# Patient Record
Sex: FEMALE | Race: WHITE | HISPANIC OR LATINO | Employment: OTHER | URBAN - METROPOLITAN AREA
[De-identification: names, ages, dates, MRNs, and addresses within clinical notes are randomized per-mention and may not be internally consistent; named-entity substitution may affect disease eponyms.]

---

## 2023-06-05 LAB
ANION GAP IN SER/PLAS: 11 MMOL/L (ref 10–20)
CALCIUM (MG/DL) IN SER/PLAS: 10.6 MG/DL (ref 8.6–10.6)
CARBON DIOXIDE, TOTAL (MMOL/L) IN SER/PLAS: 32 MMOL/L (ref 21–32)
CHLORIDE (MMOL/L) IN SER/PLAS: 102 MMOL/L (ref 98–107)
CREATININE (MG/DL) IN SER/PLAS: 1.08 MG/DL (ref 0.5–1.05)
ERYTHROCYTE DISTRIBUTION WIDTH (RATIO) BY AUTOMATED COUNT: 14.4 % (ref 11.5–14.5)
ERYTHROCYTE MEAN CORPUSCULAR HEMOGLOBIN CONCENTRATION (G/DL) BY AUTOMATED: 31.7 G/DL (ref 32–36)
ERYTHROCYTE MEAN CORPUSCULAR VOLUME (FL) BY AUTOMATED COUNT: 90 FL (ref 80–100)
ERYTHROCYTES (10*6/UL) IN BLOOD BY AUTOMATED COUNT: 4.29 X10E12/L (ref 4–5.2)
GFR FEMALE: 49 ML/MIN/1.73M2
GLUCOSE (MG/DL) IN SER/PLAS: 87 MG/DL (ref 74–99)
HEMATOCRIT (%) IN BLOOD BY AUTOMATED COUNT: 38.5 % (ref 36–46)
HEMOGLOBIN (G/DL) IN BLOOD: 12.2 G/DL (ref 12–16)
INR IN PPP BY COAGULATION ASSAY: 1 (ref 0.9–1.1)
LEUKOCYTES (10*3/UL) IN BLOOD BY AUTOMATED COUNT: 6.7 X10E9/L (ref 4.4–11.3)
NRBC (PER 100 WBCS) BY AUTOMATED COUNT: 0 /100 WBC (ref 0–0)
PLATELETS (10*3/UL) IN BLOOD AUTOMATED COUNT: 173 X10E9/L (ref 150–450)
POTASSIUM (MMOL/L) IN SER/PLAS: 4.4 MMOL/L (ref 3.5–5.3)
PROTHROMBIN TIME (PT) IN PPP BY COAGULATION ASSAY: 11.1 SEC (ref 9.8–13.4)
SODIUM (MMOL/L) IN SER/PLAS: 141 MMOL/L (ref 136–145)
UREA NITROGEN (MG/DL) IN SER/PLAS: 44 MG/DL (ref 6–23)

## 2023-06-21 ENCOUNTER — HOSPITAL ENCOUNTER (OUTPATIENT)
Dept: DATA CONVERSION | Facility: HOSPITAL | Age: 88
End: 2023-06-21
Attending: INTERNAL MEDICINE | Admitting: INTERNAL MEDICINE
Payer: MEDICARE

## 2023-06-21 DIAGNOSIS — N18.9 CHRONIC KIDNEY DISEASE, UNSPECIFIED: ICD-10-CM

## 2023-06-21 DIAGNOSIS — I35.0 NONRHEUMATIC AORTIC (VALVE) STENOSIS: ICD-10-CM

## 2023-06-21 DIAGNOSIS — E66.9 OBESITY, UNSPECIFIED: ICD-10-CM

## 2023-06-21 DIAGNOSIS — E78.5 HYPERLIPIDEMIA, UNSPECIFIED: ICD-10-CM

## 2023-06-21 DIAGNOSIS — I73.9 PERIPHERAL VASCULAR DISEASE, UNSPECIFIED (CMS-HCC): ICD-10-CM

## 2023-06-21 DIAGNOSIS — Z87.891 PERSONAL HISTORY OF NICOTINE DEPENDENCE: ICD-10-CM

## 2023-06-21 DIAGNOSIS — J44.9 CHRONIC OBSTRUCTIVE PULMONARY DISEASE, UNSPECIFIED (MULTI): ICD-10-CM

## 2023-06-21 DIAGNOSIS — I12.9 HYPERTENSIVE CHRONIC KIDNEY DISEASE WITH STAGE 1 THROUGH STAGE 4 CHRONIC KIDNEY DISEASE, OR UNSPECIFIED CHRONIC KIDNEY DISEASE: ICD-10-CM

## 2023-06-21 LAB
APPARATUS: ABNORMAL
FIO2: 28 %
FIO2: 28 % (ref 21–100)
PATIENT TEMPERATURE: 37 DEGREES
PATIENT TEMPERATURE: 37 DEGREES C
POCT BASE EXCESS, ARTERIAL: 2.9 MMOL/L (ref -2–3)
POCT BASE EXCESS, MIXED: 5.8 MMOL/L
POCT HCO3, ARTERIAL: 27.9 MMOL/L (ref 22–26)
POCT HCO3, MIXED: 31.7 MMOL/L
POCT OXY HEMOGLOBIN, ARTERIAL: 95.3 % (ref 94–98)
POCT OXY HEMOGLOBIN, MIXED: 64.7 % (ref 45–75)
POCT PCO2, ARTERIAL: 43 MMHG (ref 38–42)
POCT PCO2, MIXED: 50 MMHG
POCT PH, ARTERIAL: 7.42 (ref 7.38–7.42)
POCT PH, MIXED: 7.41
POCT PO2, ARTERIAL: 88 MMHG (ref 85–95)
POCT PO2, MIXED: 42 MMHG
POCT SO2, ARTERIAL: 96 % (ref 94–100)
POCT SO2, MIXED: 66 %
SITE OF ARTERIAL PUNCTURE: ABNORMAL

## 2023-06-25 LAB
ATRIAL RATE: 70 BPM
P AXIS: 59 DEGREES
P OFFSET: 204 MS
P ONSET: 140 MS
PR INTERVAL: 154 MS
Q ONSET: 217 MS
QRS COUNT: 12 BEATS
QRS DURATION: 90 MS
QT INTERVAL: 424 MS
QTC CALCULATION(BAZETT): 457 MS
QTC FREDERICIA: 446 MS
R AXIS: 70 DEGREES
T AXIS: 73 DEGREES
T OFFSET: 429 MS
VENTRICULAR RATE: 70 BPM

## 2023-07-24 LAB
ANION GAP IN SER/PLAS: 15 MMOL/L (ref 10–20)
CALCIUM (MG/DL) IN SER/PLAS: 9.5 MG/DL (ref 8.6–10.6)
CARBON DIOXIDE, TOTAL (MMOL/L) IN SER/PLAS: 30 MMOL/L (ref 21–32)
CHLORIDE (MMOL/L) IN SER/PLAS: 100 MMOL/L (ref 98–107)
CREATININE (MG/DL) IN SER/PLAS: 1.2 MG/DL (ref 0.5–1.05)
GFR FEMALE: 43 ML/MIN/1.73M2
GLUCOSE (MG/DL) IN SER/PLAS: 107 MG/DL (ref 74–99)
POTASSIUM (MMOL/L) IN SER/PLAS: 4.6 MMOL/L (ref 3.5–5.3)
SODIUM (MMOL/L) IN SER/PLAS: 140 MMOL/L (ref 136–145)
UREA NITROGEN (MG/DL) IN SER/PLAS: 36 MG/DL (ref 6–23)

## 2023-08-11 ENCOUNTER — APPOINTMENT (OUTPATIENT)
Dept: PRIMARY CARE | Facility: CLINIC | Age: 88
End: 2023-08-11
Payer: MEDICARE

## 2023-08-18 ENCOUNTER — OFFICE VISIT (OUTPATIENT)
Dept: PRIMARY CARE | Facility: CLINIC | Age: 88
End: 2023-08-18
Payer: MEDICARE

## 2023-08-18 VITALS
BODY MASS INDEX: 39.27 KG/M2 | SYSTOLIC BLOOD PRESSURE: 120 MMHG | HEIGHT: 60 IN | TEMPERATURE: 97.5 F | DIASTOLIC BLOOD PRESSURE: 80 MMHG | HEART RATE: 72 BPM | WEIGHT: 200 LBS | OXYGEN SATURATION: 93 %

## 2023-08-18 DIAGNOSIS — E55.9 VITAMIN D DEFICIENCY: ICD-10-CM

## 2023-08-18 DIAGNOSIS — Z00.00 ROUTINE GENERAL MEDICAL EXAMINATION AT HEALTH CARE FACILITY: Primary | ICD-10-CM

## 2023-08-18 DIAGNOSIS — I50.9 CHRONIC HEART FAILURE, UNSPECIFIED HEART FAILURE TYPE (MULTI): ICD-10-CM

## 2023-08-18 DIAGNOSIS — I10 PRIMARY HYPERTENSION: ICD-10-CM

## 2023-08-18 DIAGNOSIS — E66.01 CLASS 2 SEVERE OBESITY DUE TO EXCESS CALORIES WITH SERIOUS COMORBIDITY AND BODY MASS INDEX (BMI) OF 39.0 TO 39.9 IN ADULT (MULTI): ICD-10-CM

## 2023-08-18 DIAGNOSIS — J44.9 MODERATE COPD (CHRONIC OBSTRUCTIVE PULMONARY DISEASE) (MULTI): ICD-10-CM

## 2023-08-18 DIAGNOSIS — S32.9XXS CLOSED NONDISPLACED FRACTURE OF PELVIS, UNSPECIFIED PART OF PELVIS, SEQUELA: ICD-10-CM

## 2023-08-18 DIAGNOSIS — J96.11 CHRONIC RESPIRATORY FAILURE WITH HYPOXIA (MULTI): ICD-10-CM

## 2023-08-18 DIAGNOSIS — E78.2 MIXED HYPERLIPIDEMIA: ICD-10-CM

## 2023-08-18 DIAGNOSIS — N18.31 CHRONIC RENAL IMPAIRMENT, STAGE 3A (MULTI): ICD-10-CM

## 2023-08-18 PROBLEM — E66.812 CLASS 2 SEVERE OBESITY DUE TO EXCESS CALORIES WITH SERIOUS COMORBIDITY AND BODY MASS INDEX (BMI) OF 39.0 TO 39.9 IN ADULT: Status: ACTIVE | Noted: 2023-08-18

## 2023-08-18 PROBLEM — S32.9XXA PELVIC FRACTURE (MULTI): Status: ACTIVE | Noted: 2023-08-18

## 2023-08-18 PROCEDURE — 1160F RVW MEDS BY RX/DR IN RCRD: CPT | Performed by: FAMILY MEDICINE

## 2023-08-18 PROCEDURE — G0442 ANNUAL ALCOHOL SCREEN 15 MIN: HCPCS | Performed by: FAMILY MEDICINE

## 2023-08-18 PROCEDURE — G0439 PPPS, SUBSEQ VISIT: HCPCS | Performed by: FAMILY MEDICINE

## 2023-08-18 PROCEDURE — 3079F DIAST BP 80-89 MM HG: CPT | Performed by: FAMILY MEDICINE

## 2023-08-18 PROCEDURE — 1170F FXNL STATUS ASSESSED: CPT | Performed by: FAMILY MEDICINE

## 2023-08-18 PROCEDURE — 1158F ADVNC CARE PLAN TLK DOCD: CPT | Performed by: FAMILY MEDICINE

## 2023-08-18 PROCEDURE — 1036F TOBACCO NON-USER: CPT | Performed by: FAMILY MEDICINE

## 2023-08-18 PROCEDURE — 99497 ADVNCD CARE PLAN 30 MIN: CPT | Performed by: FAMILY MEDICINE

## 2023-08-18 PROCEDURE — 99397 PER PM REEVAL EST PAT 65+ YR: CPT | Performed by: FAMILY MEDICINE

## 2023-08-18 PROCEDURE — 3074F SYST BP LT 130 MM HG: CPT | Performed by: FAMILY MEDICINE

## 2023-08-18 PROCEDURE — 1159F MED LIST DOCD IN RCRD: CPT | Performed by: FAMILY MEDICINE

## 2023-08-18 PROCEDURE — 1125F AMNT PAIN NOTED PAIN PRSNT: CPT | Performed by: FAMILY MEDICINE

## 2023-08-18 RX ORDER — ACETAMINOPHEN 500 MG
1 TABLET ORAL DAILY
COMMUNITY
Start: 2023-07-25

## 2023-08-18 RX ORDER — SIMVASTATIN 20 MG/1
1 TABLET, FILM COATED ORAL DAILY
COMMUNITY
Start: 2015-04-24 | End: 2023-08-18 | Stop reason: SDUPTHER

## 2023-08-18 RX ORDER — NIACIN (INOSITOL NIACINATE) 400(500MG)
1 CAPSULE ORAL DAILY
COMMUNITY

## 2023-08-18 RX ORDER — ASPIRIN 81 MG/1
1 TABLET ORAL DAILY
COMMUNITY

## 2023-08-18 RX ORDER — BUDESONIDE AND FORMOTEROL FUMARATE DIHYDRATE 80; 4.5 UG/1; UG/1
AEROSOL RESPIRATORY (INHALATION)
COMMUNITY
Start: 2019-07-19 | End: 2023-10-17 | Stop reason: WASHOUT

## 2023-08-18 RX ORDER — TIOTROPIUM BROMIDE INHALATION SPRAY 3.12 UG/1
2 SPRAY, METERED RESPIRATORY (INHALATION) DAILY
COMMUNITY
Start: 2021-04-19 | End: 2023-08-18 | Stop reason: SDUPTHER

## 2023-08-18 RX ORDER — POTASSIUM CHLORIDE 750 MG/1
1 TABLET, FILM COATED, EXTENDED RELEASE ORAL DAILY
COMMUNITY
Start: 2021-01-19

## 2023-08-18 RX ORDER — MULTIVITAMIN
1 TABLET ORAL DAILY
COMMUNITY

## 2023-08-18 RX ORDER — LOSARTAN POTASSIUM 100 MG/1
1 TABLET ORAL DAILY
COMMUNITY
Start: 2016-08-01 | End: 2023-08-18 | Stop reason: SDUPTHER

## 2023-08-18 RX ORDER — GLUCOSAMINE/CHONDR SU A SOD 750-600 MG
TABLET ORAL
COMMUNITY
Start: 2018-05-10

## 2023-08-18 RX ORDER — MIRABEGRON 25 MG/1
1 TABLET, FILM COATED, EXTENDED RELEASE ORAL DAILY
COMMUNITY
Start: 2020-09-03 | End: 2023-10-17 | Stop reason: WASHOUT

## 2023-08-18 RX ORDER — ALBUTEROL SULFATE 90 UG/1
2 AEROSOL, METERED RESPIRATORY (INHALATION) EVERY 4 HOURS PRN
Qty: 18 G | Refills: 3 | Status: SHIPPED | OUTPATIENT
Start: 2023-08-18 | End: 2024-03-27 | Stop reason: SDUPTHER

## 2023-08-18 RX ORDER — TIOTROPIUM BROMIDE INHALATION SPRAY 3.12 UG/1
2 SPRAY, METERED RESPIRATORY (INHALATION) DAILY
Qty: 3 EACH | Refills: 3 | Status: SHIPPED | OUTPATIENT
Start: 2023-08-18 | End: 2024-08-17

## 2023-08-18 RX ORDER — SIMVASTATIN 20 MG/1
20 TABLET, FILM COATED ORAL DAILY
Qty: 90 TABLET | Refills: 3 | Status: SHIPPED | OUTPATIENT
Start: 2023-08-18 | End: 2024-03-27 | Stop reason: SDUPTHER

## 2023-08-18 RX ORDER — LOSARTAN POTASSIUM 100 MG/1
100 TABLET ORAL DAILY
Qty: 90 TABLET | Refills: 3 | Status: SHIPPED | OUTPATIENT
Start: 2023-08-18 | End: 2024-03-27 | Stop reason: SDUPTHER

## 2023-08-18 RX ORDER — TORSEMIDE 20 MG/1
1 TABLET ORAL DAILY
COMMUNITY
Start: 2021-01-19 | End: 2023-08-18 | Stop reason: SDUPTHER

## 2023-08-18 RX ORDER — TORSEMIDE 20 MG/1
20 TABLET ORAL DAILY
Qty: 90 TABLET | Refills: 3 | Status: SHIPPED | OUTPATIENT
Start: 2023-08-18 | End: 2023-10-23 | Stop reason: SDUPTHER

## 2023-08-18 RX ORDER — CALCIUM CARBONATE 600 MG
1200 TABLET ORAL DAILY
COMMUNITY
Start: 2016-05-05

## 2023-08-18 RX ORDER — ALBUTEROL SULFATE 90 UG/1
AEROSOL, METERED RESPIRATORY (INHALATION)
COMMUNITY
Start: 2021-08-05 | End: 2023-08-18 | Stop reason: SDUPTHER

## 2023-08-18 ASSESSMENT — PATIENT HEALTH QUESTIONNAIRE - PHQ9
2. FEELING DOWN, DEPRESSED OR HOPELESS: NOT AT ALL
SUM OF ALL RESPONSES TO PHQ9 QUESTIONS 1 AND 2: 0
SUM OF ALL RESPONSES TO PHQ9 QUESTIONS 1 & 2: 0
2. FEELING DOWN, DEPRESSED OR HOPELESS: NOT AT ALL
1. LITTLE INTEREST OR PLEASURE IN DOING THINGS: NOT AT ALL
1. LITTLE INTEREST OR PLEASURE IN DOING THINGS: NOT AT ALL

## 2023-08-18 ASSESSMENT — ACTIVITIES OF DAILY LIVING (ADL)
MANAGING_FINANCES: INDEPENDENT
GROCERY_SHOPPING: INDEPENDENT
BATHING: INDEPENDENT
TAKING_MEDICATION: INDEPENDENT
DOING_HOUSEWORK: INDEPENDENT
DRESSING: INDEPENDENT

## 2023-08-18 ASSESSMENT — LIFESTYLE VARIABLES
HOW OFTEN DO YOU HAVE A DRINK CONTAINING ALCOHOL: NEVER
HOW OFTEN DO YOU HAVE SIX OR MORE DRINKS ON ONE OCCASION: NEVER

## 2023-08-18 ASSESSMENT — ENCOUNTER SYMPTOMS
LOSS OF SENSATION IN FEET: 0
OCCASIONAL FEELINGS OF UNSTEADINESS: 0
DEPRESSION: 0

## 2023-08-18 ASSESSMENT — PAIN SCALES - GENERAL: PAINLEVEL: 6

## 2023-08-18 NOTE — ACP (ADVANCE CARE PLANNING)
Confirming Previous Code Status:   Advance Care Planning Note     Discussion Date: 08/18/23   Discussion Participants: patient    The patient wishes to discuss Advance Care Planning today and the following is a brief summary of our discussion.     Patient has capacity to make their own medical decisions: Yes  Health Care Agent/Surrogate Decision Maker documented in chart: Yes    Documents on file and valid:  Advance Directive/Living Will: Yes   Health Care Power of : Yes  Other:     Communication of Medical Status/Prognosis:        Communication of Treatment Goals/Options:        Treatment Decisions  Goals of Care: quality of life is prioritized; willing to accept low-burden treatments to achieve meaningful goals     Follow Up Plan    Team Members    Time Statement: Total face to face time spent on advance care planning was 16 minutes with 16 minutes spent in counseling, including the explanation.    Ney Crawley DO  8/18/2023 10:53 AM

## 2023-08-18 NOTE — PROGRESS NOTES
Subjective   Reason for Visit: Chelsey Cazares is an 89 y.o. female here for a Medicare Wellness visit.     Past Medical, Surgical, and Family History reviewed and updated in chart.    Reviewed all medications by prescribing practitioner or clinical pharmacist (such as prescriptions, OTCs, herbal therapies and supplements) and documented in the medical record.    HPI  89-year-old female presents for Medicare wellness visit and complete physical exam.  Will be having aortic valve surgery upcoming.  Medication refills.  No other acute complaints.  Patient Care Team:  Ney CHAKRABORTY DO as PCP - General  Ney CHAKRABORTY DO as PCP - [employer] PCP     Review of Systems  Constitutional: no chills, no fever and no night sweats.   Eyes: no blurred vision and no eyesight problems.   ENT: no hearing loss, no nasal congestion, no nasal discharge, no hoarseness and no sore throat.   Cardiovascular: no chest pain, no intermittent leg claudication, no lower extremity edema, no palpitations and no syncope.   Respiratory: no cough, no shortness of breath during exertion, no shortness of breath at rest and no wheezing.   Gastrointestinal: no abdominal pain, no blood in stools, no constipation, no diarrhea, no melena, no nausea, no rectal pain and no vomiting.   Genitourinary: no dysuria, no change in urinary frequency, no urinary hesitancy and no feelings of urinary urgency.   Neurological: no difficulty walking, no headache, no limb weakness, no numbness and no tingling.   Psychiatric: no anxiety, no depression, no anhedonia and no substance use disorders.   Endocrine: no recent weight gain and no recent weight loss.   Hematologic/Lymphatic: no tendency for easy bruising and no swollen glands.  Objective   Vitals:  /80 (BP Location: Left arm, Patient Position: Sitting, BP Cuff Size: Large adult)   Pulse 72   Temp 36.4 °C (97.5 °F) (Temporal)   Ht 1.524 m (5')   Wt 90.7 kg (200 lb)   SpO2 93%   BMI  39.06 kg/m²       Physical Exam  Constitutional: Alert and in no acute distress. Well developed, well nourished.   Eyes: Pupils were equal in size, round, reactive to light (PERRL) with normal accommodation and extraocular movements intact (EOMI). Ophthalmoscopic examination: Normal.   Ears, Nose, Mouth, and Throat: External inspection of ears and nose: Normal. Otoscopic examination: Normal. Lips, teeth, and gums: Normal. Oropharynx: Normal.   Neck: No neck mass was observed. Supple. Thyroid not enlarged and there were no palpable thyroid nodules.   Cardiovascular: Heart rate and rhythm were normal, normal S1 and S2, no gallops, 3/6 MARCOS murmur and no pericardial rub. Carotid pulses: Normal with no bruits. Abdominal aorta: Normal. Pedal pulses: Normal. No peripheral edema.   Pulmonary: No respiratory distress. Clear bilateral breath sounds.   Abdomen: Soft nontender; no abdominal mass palpated. Normal bowel sounds. No organomegaly.   Skin: Normal skin color and pigmentation, normal skin turgor, and no rash.   Psychiatric: Judgment and insight: Intact. Mood and affect: Normal  Assessment/Plan   Problem List Items Addressed This Visit       Routine general medical examination at health care facility - Primary    Chronic heart failure, unspecified heart failure type (CMS/HCC)    Relevant Medications    torsemide (Demadex) 20 mg tablet    Chronic respiratory failure with hypoxia (CMS/[facility])    Chronic renal impairment, stage 3a (CMS/HCC)    Class 2 severe obesity due to excess calories with serious comorbidity and body mass index (BMI) of 39.0 to 39.9 in adult (CMS/[facility])    Mixed hyperlipidemia    Relevant Medications    simvastatin (Zocor) 20 mg tablet    Primary hypertension    Relevant Medications    losartan (Cozaar) 100 mg tablet    Vitamin D deficiency    Relevant Orders    Vitamin D 25-Hydroxy,Total    Moderate COPD (chronic obstructive pulmonary disease) (CMS/[facility])    Relevant Medications    Spiriva Respimat 2.5  mcg/actuation inhaler    albuterol 90 mcg/actuation inhaler    Pelvic fracture (CMS/[facility])       #1.  Stable physical exam.  2.  Chronic heart failure stable.  Continue current medication.  Follow-up in 1 year.  3.  Chronic renal impairment stage IIIa is stable.  Avoid nephrotoxins.  Plenty of fluids.  Follow-up in 6 months.  4.  Chronic respiratory failure with hypoxia stable.  Asymptomatic.  Monitor symptoms.  Follow-up in 1 year.  5.  We discussed class II severe obesity with a BMI above 35 and a serious comorbidity of hypertension.  We discussed exercise weight loss.  Calorie count for weight loss.  Recheck BMI in 6 months.  6.  COPD is stable.  Continue current medication.  Follow-up in 12 months.  7.  I spent 15 minutes screening for alcohol use.

## 2023-09-05 ENCOUNTER — LAB (OUTPATIENT)
Dept: LAB | Facility: LAB | Age: 88
End: 2023-09-05
Payer: MEDICARE

## 2023-09-05 DIAGNOSIS — E55.9 VITAMIN D DEFICIENCY: ICD-10-CM

## 2023-09-05 LAB
ANION GAP IN SER/PLAS: 17 MMOL/L (ref 10–20)
CALCIDIOL (25 OH VITAMIN D3) (NG/ML) IN SER/PLAS: 81 NG/ML
CALCIUM (MG/DL) IN SER/PLAS: 9.8 MG/DL (ref 8.6–10.6)
CARBON DIOXIDE, TOTAL (MMOL/L) IN SER/PLAS: 27 MMOL/L (ref 21–32)
CHLORIDE (MMOL/L) IN SER/PLAS: 102 MMOL/L (ref 98–107)
CREATININE (MG/DL) IN SER/PLAS: 1.25 MG/DL (ref 0.5–1.05)
ERYTHROCYTE DISTRIBUTION WIDTH (RATIO) BY AUTOMATED COUNT: 14.9 % (ref 11.5–14.5)
ERYTHROCYTE MEAN CORPUSCULAR HEMOGLOBIN CONCENTRATION (G/DL) BY AUTOMATED: 32.2 G/DL (ref 32–36)
ERYTHROCYTE MEAN CORPUSCULAR VOLUME (FL) BY AUTOMATED COUNT: 90 FL (ref 80–100)
ERYTHROCYTES (10*6/UL) IN BLOOD BY AUTOMATED COUNT: 4.16 X10E12/L (ref 4–5.2)
GFR FEMALE: 41 ML/MIN/1.73M2
GLUCOSE (MG/DL) IN SER/PLAS: 99 MG/DL (ref 74–99)
HEMATOCRIT (%) IN BLOOD BY AUTOMATED COUNT: 37.3 % (ref 36–46)
HEMOGLOBIN (G/DL) IN BLOOD: 12 G/DL (ref 12–16)
INR IN PPP BY COAGULATION ASSAY: 1 (ref 0.9–1.1)
LEUKOCYTES (10*3/UL) IN BLOOD BY AUTOMATED COUNT: 6.3 X10E9/L (ref 4.4–11.3)
NRBC (PER 100 WBCS) BY AUTOMATED COUNT: 0 /100 WBC (ref 0–0)
PLATELETS (10*3/UL) IN BLOOD AUTOMATED COUNT: 175 X10E9/L (ref 150–450)
POTASSIUM (MMOL/L) IN SER/PLAS: 4.5 MMOL/L (ref 3.5–5.3)
PROTHROMBIN TIME (PT) IN PPP BY COAGULATION ASSAY: 11.6 SEC (ref 9.8–12.8)
SODIUM (MMOL/L) IN SER/PLAS: 141 MMOL/L (ref 136–145)
UREA NITROGEN (MG/DL) IN SER/PLAS: 47 MG/DL (ref 6–23)

## 2023-09-05 PROCEDURE — 36415 COLL VENOUS BLD VENIPUNCTURE: CPT

## 2023-09-05 PROCEDURE — 82306 VITAMIN D 25 HYDROXY: CPT

## 2023-09-21 PROBLEM — R00.1 SINUS BRADYCARDIA: Status: ACTIVE | Noted: 2023-09-21

## 2023-09-21 PROBLEM — I25.10 CORONARY ARTERY DISEASE: Status: ACTIVE | Noted: 2023-09-21

## 2023-09-21 PROBLEM — J30.0 VASOMOTOR RHINITIS: Status: ACTIVE | Noted: 2023-09-21

## 2023-09-21 PROBLEM — I87.2 VENOUS INSUFFICIENCY: Status: ACTIVE | Noted: 2023-09-21

## 2023-09-21 PROBLEM — N39.46 URINARY INCONTINENCE, MIXED: Status: ACTIVE | Noted: 2023-09-21

## 2023-09-21 PROBLEM — F51.01 PRIMARY INSOMNIA: Status: ACTIVE | Noted: 2023-09-21

## 2023-09-21 PROBLEM — R39.81 FUNCTIONAL URINARY INCONTINENCE: Status: ACTIVE | Noted: 2023-09-21

## 2023-09-21 PROBLEM — N39.41 URGE INCONTINENCE: Status: ACTIVE | Noted: 2023-09-21

## 2023-09-21 PROBLEM — F41.8 SITUATIONAL ANXIETY: Status: ACTIVE | Noted: 2023-09-21

## 2023-09-21 PROBLEM — R55 SYNCOPE: Status: ACTIVE | Noted: 2023-09-21

## 2023-09-21 PROBLEM — M81.0 OSTEOPOROSIS: Status: ACTIVE | Noted: 2023-09-21

## 2023-09-21 PROBLEM — I50.32 CHRONIC DIASTOLIC CONGESTIVE HEART FAILURE (MULTI): Status: ACTIVE | Noted: 2023-09-21

## 2023-09-21 PROBLEM — I35.0 SEVERE AORTIC STENOSIS: Status: ACTIVE | Noted: 2023-09-21

## 2023-09-21 PROBLEM — N39.3 SUI (STRESS URINARY INCONTINENCE, FEMALE): Status: ACTIVE | Noted: 2023-09-21

## 2023-09-21 PROBLEM — E66.01 MORBID OBESITY WITH BODY MASS INDEX (BMI) OF 40.0 OR HIGHER (MULTI): Status: ACTIVE | Noted: 2023-09-21

## 2023-09-21 PROBLEM — F17.200 SMOKING: Status: ACTIVE | Noted: 2023-09-21

## 2023-09-21 PROBLEM — H00.014 HORDEOLUM EXTERNUM OF LEFT UPPER EYELID: Status: ACTIVE | Noted: 2023-09-21

## 2023-09-21 PROBLEM — R06.02 SOB (SHORTNESS OF BREATH): Status: ACTIVE | Noted: 2023-09-21

## 2023-09-21 PROBLEM — R35.0 URINARY FREQUENCY: Status: ACTIVE | Noted: 2023-09-21

## 2023-09-21 PROBLEM — I35.0 MODERATE TO SEVERE AORTIC STENOSIS: Status: ACTIVE | Noted: 2023-09-21

## 2023-09-21 PROBLEM — E66.01 CLASS 3 SEVERE OBESITY WITH BODY MASS INDEX (BMI) OF 40.0 TO 44.9 IN ADULT (MULTI): Status: ACTIVE | Noted: 2023-09-21

## 2023-09-21 PROBLEM — I35.0 AORTIC STENOSIS: Status: ACTIVE | Noted: 2023-09-21

## 2023-09-21 PROBLEM — R60.0 BILATERAL EDEMA OF LOWER EXTREMITY: Status: ACTIVE | Noted: 2023-09-21

## 2023-09-21 PROBLEM — E66.813 CLASS 3 SEVERE OBESITY WITH BODY MASS INDEX (BMI) OF 40.0 TO 44.9 IN ADULT: Status: ACTIVE | Noted: 2023-09-21

## 2023-09-21 PROBLEM — R53.82 CHRONIC FATIGUE: Status: ACTIVE | Noted: 2023-09-21

## 2023-09-21 RX ORDER — ATORVASTATIN CALCIUM 20 MG/1
20 TABLET, FILM COATED ORAL DAILY
COMMUNITY
Start: 2005-03-10 | End: 2023-10-17 | Stop reason: WASHOUT

## 2023-09-21 RX ORDER — ALENDRONATE SODIUM 70 MG/1
70 TABLET ORAL
COMMUNITY
Start: 2005-03-10 | End: 2023-10-17 | Stop reason: WASHOUT

## 2023-09-21 RX ORDER — OXYBUTYNIN CHLORIDE 5 MG/1
10 TABLET, EXTENDED RELEASE ORAL DAILY
COMMUNITY
Start: 2005-03-10 | End: 2023-10-17 | Stop reason: WASHOUT

## 2023-09-21 RX ORDER — NITROGLYCERIN 0.4 MG/1
0.4 TABLET SUBLINGUAL EVERY 5 MIN PRN
COMMUNITY
Start: 2005-03-10 | End: 2023-10-17 | Stop reason: WASHOUT

## 2023-09-30 NOTE — H&P
History & Physical Reviewed:   I have reviewed the History and Physical dated:  05-Jun-2023   History and Physical reviewed and relevant findings noted. Patient examined to review pertinent physical  findings.: Significant findings noted below   Findings: On arrival to cath lab holding area O2  sat 89-90% on room air.  Patient reports she has home O2 for COPD but does not like it so does wear it.   Home Medications Reviewed: no changes noted   Allergies Reviewed: no changes noted       Airway/Sedation Assessment:  ·  Emotional Status calm   ·  Neurologic alert & oriented x 3   ·  Respiratory Diminished bilaterally.   ·  Cardiovascular NSR, HR 70, + systolic murmur.   ·  GI/ soft, nontender     · Pulses present: Pedal Left, Pedal Right, Radial Left, Radial Right     ·  Mouth Opening OK yes   ·  Neck Flexibility OK yes   ·  Loose Teeth no     Oropharyngeal Classification:          ·  Oropharyngeal Classification Class II   ·  ASA PS Classification ASA III   ·  Sedation Plan moderate sedation       ERAS (Enhanced Recovery After Surgery):  ·  ERAS Patient: no     Consent:   COVID-19 Consent:  ·  COVID-19 Risk Consent Surgeon has reviewed key risks related to the risk of dianne COVID-19 and if they contract COVID-19 what the risks are.     Coronavirus Attestation of Need for Surgery:  ·  COVID-19 Surgery / Procedure Attestation: Select all criteria that apply Presence of severe symptoms causing an inability to perform activities of daily living       Electronic Signatures:  Gretchen Razo (APRN-CNP)  (Signed 21-Jun-2023 08:27)   Authored: History & Physical Reviewed, Airway/Sedation,  ERAS, Consent, Note Completion  Luis Felipe Nickerson)  (Signed 21-Jun-2023 14:55)   Authored: Consent   Co-Signer: History & Physical Reviewed, Airway/Sedation, ERAS, Consent, Note Completion      Last Updated: 21-Jun-2023 14:55 by Luis Felipe Nickerson)

## 2023-10-05 DIAGNOSIS — R41.3 MEMORY CHANGES: Primary | ICD-10-CM

## 2023-10-05 DIAGNOSIS — I50.9 CHRONIC HEART FAILURE, UNSPECIFIED HEART FAILURE TYPE (MULTI): Primary | ICD-10-CM

## 2023-10-05 DIAGNOSIS — J44.9 MODERATE COPD (CHRONIC OBSTRUCTIVE PULMONARY DISEASE) (MULTI): ICD-10-CM

## 2023-10-17 ENCOUNTER — TELEMEDICINE (OUTPATIENT)
Dept: PHARMACY | Facility: HOSPITAL | Age: 88
End: 2023-10-17
Payer: MEDICARE

## 2023-10-17 DIAGNOSIS — J44.9 MODERATE COPD (CHRONIC OBSTRUCTIVE PULMONARY DISEASE) (MULTI): ICD-10-CM

## 2023-10-17 DIAGNOSIS — I50.9 CHRONIC HEART FAILURE, UNSPECIFIED HEART FAILURE TYPE (MULTI): ICD-10-CM

## 2023-10-17 NOTE — PROGRESS NOTES
Pharmacy Post-Discharge Visit Initial    Chelsey Cazares is a 89 y.o. female was referred to Clinical Pharmacy Team to complete a post-discharge medication optimization and monitoring visit.  The patient was referred for their COPD and Congestive Heart Failure.    Admission Date: 9.15.23  Discharge Date: 9.16.23 for severe aortic valve stenosis; elective TAVR    Referring Provider: Ney Crawley, DO    Subjective   No Known Allergies    [facility] - [place], [place] - [address]  [address]  [address]  [facility] [address]  Phone: [phone] Fax: [phone]    [facility] - [place], [place] - [address]  [address] [facility] [address]  Phone: [phone] Fax: [phone]      Social History     Social History Narrative    Not on file        Notable Medication changes following discharge:  No changes made      HPI  COPD ASSESSMENT  *former smoker*  Rescue Inhaler Use (albuterol)  -How many times per week do you use your rescue inhale? Not often    Inhaler Technique:  -How do you use your rescue inhaler?  --not assessed via telephone visit    -How do you use your maintenance inhaler? (Spiriva respimat)  --not assessed via telephone visit    Secondary Prevention (vaccines):  -Influenza: Date [12/1/21]  -PCV13: Date [5/10/2018]  -PPSV23: Date [7/19/19]    Sx Management:  -Increased cough? no  -Increased sputum production? Yes - not more than usual  -Increased SOB? no    Exacerbation Hx:  -When was your last hospitalization for an exacerbation? None  -When was the last time you were treated with antibiotics and/or steroids? N/A        CHF ASSESSMENT - saw cardiology 10.13.23, next appt on 10.19.23  Symptom/Staging:  -Most recent ejection fraction: 70-75%  -NYHA Stage: I  -Weight changes?: Yes, describe: losing - down to 193lb (purposefully, eating less    Guideline-Directed Medical Therapy:  -ARNI: No   -If no, then ACEi/ARB?: Yes, describe: losartan  "100mg daily  -Beta Blocker: No  -MRA: No  -SGLT2i: No  -torsemide    Secondary Prevention:  -The ASCVD Risk score (Eileen DK, et al., 2019) failed to calculate for the following reasons:    The 2019 ASCVD risk score is only valid for ages 40 to 79   -Aspirin 81mg? yes  -Statin?: Yes, describe: simvastatin 20mg daily  -HTN?: No      Review of Systems    Objective     There were no vitals taken for this visit.     LAB  Lab Results   Component Value Date    BILITOT 0.6 12/05/2022    CALCIUM 8.4 (L) 09/16/2023    CO2 32 09/16/2023     09/16/2023    CREATININE 1.15 (H) 09/16/2023    GLUCOSE 111 (H) 09/16/2023    ALKPHOS 82 12/05/2022    K 4.4 09/16/2023    PROT 6.8 12/05/2022     09/16/2023    AST 17 12/05/2022    ALT 20 12/05/2022    BUN 34 (H) 09/16/2023    ANIONGAP 12 09/16/2023    MG 2.22 09/16/2023    PHOS 4.3 09/16/2023    ALBUMIN 3.3 (L) 09/16/2023    LIPASE 30 04/16/2021    GFRF 45 (A) 09/16/2023     Lab Results   Component Value Date    TRIG 86 12/05/2022    CHOL 166 12/05/2022    HDL 56.8 12/05/2022     No results found for: \"HGBA1C\"      Current Outpatient Medications on File Prior to Visit   Medication Sig Dispense Refill    albuterol 90 mcg/actuation inhaler Inhale 2 puffs every 4 hours if needed for wheezing. 18 g 3    alendronate (Fosamax) 70 mg tablet Take 1 tablet (70 mg) by mouth every 7 days.      aspirin 81 mg EC tablet Take 1 tablet (81 mg) by mouth once daily.      atorvastatin (Lipitor) 20 mg tablet Take 1 tablet (20 mg) by mouth once daily.      budesonide-formoteroL (Symbicort) 80-4.5 mcg/actuation inhaler Inhale.      calcium carbonate 600 mg calcium (1,500 mg) tablet Take 2 tablets (1,200 mg) by mouth once daily.      cholecalciferol (Vitamin D-3) 5,000 Units tablet Take 1 tablet (5,000 Units) by mouth once daily.      coenzyme Q10-vitamin E 100-5 mg-unit capsule Take 1 capsule by mouth once daily.      fish oil concentrate (Omega-3) 120-180 mg capsule Take 1 capsule (1 g) by mouth " once daily.      glucosamine HCl 1,500 mg tablet Take by mouth.      losartan (Cozaar) 100 mg tablet Take 1 tablet (100 mg) by mouth once daily. 90 tablet 3    mirabegron (Myrbetriq) 25 mg tablet extended release 24 hr 24 hr tablet Take 1 tablet (25 mg) by mouth once daily.      multivitamin tablet Take 1 tablet by mouth once daily.      nitroglycerin (Nitrostat) 0.4 mg SL tablet Place 1 tablet (0.4 mg) under the tongue every 5 minutes if needed for chest pain.      oxybutynin XL (Ditropan XL) 5 mg 24 hr tablet Take 2 tablets (10 mg) by mouth once daily.      potassium chloride CR (Klor-Con M10) 10 mEq ER tablet Take 1 tablet (10 mEq) by mouth once daily.      simvastatin (Zocor) 20 mg tablet Take 1 tablet (20 mg) by mouth once daily. 90 tablet 3    Spiriva Respimat 2.5 mcg/actuation inhaler Inhale 2 puffs once daily. 3 each 3    torsemide (Demadex) 20 mg tablet Take 1 tablet (20 mg) by mouth once daily. 90 tablet 3    vitamin B complex/folic acid (B-STRESS ORAL) Take 1 capsule by mouth once daily.       No current facility-administered medications on file prior to visit.        HISTORICAL PHARMACOTHERAPY  none    DRUG INTERACTIONS   Potassium and oxybuytnin - Use of solid oral dosage forms of potassium in patients treated with anticholinergics may result in gastrointestinal erosions, ulcers, stenosis and bleeding.  (Of note, neither of these medications show recent fills in Surescripts)    Assessment/Plan   Problem List Items Addressed This Visit       Chronic heart failure, unspecified heart failure type (CMS/HCC)     Most recent LVEF of 70-75%, on losartan 100mg daily. Patient is closely followed by cardiology (10.13.23, 10.19.23).     Patient endorsed intentional weightloss, no water retention reported.    Continue losartan and close monitoring with cardiology.    No changes made by pharmacist today.         Moderate COPD (chronic obstructive pulmonary disease) (CMS/HCC)     Patient appears to be well controlled  at this time on Spiriva daily and albuterol as needed. She did not report any respiratory symptoms besides sputum production which is typical for her.    No changes made by pharmacist at this time.            Labs ordered: none    Follow-up: none at this time - discussed [facility] PAP and what can be discussed with PharmD - instructed pt to ask PCP for      Time spent with pt: Total length of time 15 (minutes) of the encounter and more than 50% was spent counseling the patient.    Rani Ward PharmD, [facility]  Clinical Pharmacist  Pharmacy Services  [phone]    Continue all meds under the continuation of care with the referring provider and clinical pharmacy team.    Verbal consent to manage patient's drug therapy was obtained from the patient. They were informed they may decline to participate or withdraw from participation in pharmacy services at any time.

## 2023-10-17 NOTE — ASSESSMENT & PLAN NOTE
Patient appears to be well controlled at this time on Spiriva daily and albuterol as needed. She did not report any respiratory symptoms besides sputum production which is typical for her.    No changes made by pharmacist at this time.

## 2023-10-17 NOTE — ASSESSMENT & PLAN NOTE
Most recent LVEF of 70-75%, on losartan 100mg daily. Patient is closely followed by cardiology (10.13.23, 10.19.23).     Patient endorsed intentional weightloss, no water retention reported.    Continue losartan and close monitoring with cardiology.    No changes made by pharmacist today.

## 2023-10-19 ENCOUNTER — HOSPITAL ENCOUNTER (OUTPATIENT)
Dept: CARDIOLOGY | Facility: CLINIC | Age: 88
Discharge: HOME | End: 2023-10-19
Payer: MEDICARE

## 2023-10-19 DIAGNOSIS — Z95.2 PRESENCE OF PROSTHETIC HEART VALVE: ICD-10-CM

## 2023-10-19 DIAGNOSIS — Q23.0: ICD-10-CM

## 2023-10-19 PROCEDURE — 93306 TTE W/DOPPLER COMPLETE: CPT

## 2023-10-19 PROCEDURE — 93306 TTE W/DOPPLER COMPLETE: CPT | Performed by: INTERNAL MEDICINE

## 2023-10-21 LAB — EJECTION FRACTION APICAL 4 CHAMBER: 68.8

## 2023-10-22 PROBLEM — Z95.2 S/P TAVR (TRANSCATHETER AORTIC VALVE REPLACEMENT): Status: ACTIVE | Noted: 2023-10-22

## 2023-10-22 NOTE — PROGRESS NOTES
Subjective   Chelsey Cazares is a 89 y.o. female.    Chief Complaint:  Follow-up transaortic valve replacement.    HPI    Since her last visit she underwent transaortic valve replacement with a 26 mm valve.  She had no complications.  Did not require pacer.  Follow-up echo study showed trivial aortic regurgitation with a well-seated valve.  She has normal left ventricular function.    As part of her work-up she had a CT scan of the abdomen.  This showed incidental finding of gallstones.    Cardiac catheterization in June 2023 demonstrated mild coronary artery disease.  She did have pulmonary hypertension.  Elevated left ventricular end-diastolic pressures.    On April 17, 2021, she was hospitalized when she presented with respiratory failure and a syncopal event. The patient states she was bringing her groceries and putting them away. She became suddenly very weak and dizzy and lightheaded. She slumped to the floor and the rescue squad was called. At that point she lost consciousness. She was brought to the hospital where she is noted to be in respiratory failure. Her oxygen saturations were 75%.    Past cardiac history of diastolic congestive heart failure and aortic stenosis.    Past medical history: Significant for underlying chronic obstructive lung disease and renal insufficiency.    Long history of smoking 1 pack/day. She has been able to quit smoking.     Allergies  None Known    · No Known Allergies    Family History  Brother    · Family history of malignant neoplasm (V16.9) (Z80.9)    Social History  Problems    · Denies alcohol consumption (V49.89) (Z78.9)   · Former smoker (V15.82) (Z87.891)   · No alcohol use   · Single    ROS    Objective   Physical Exam           Lab Review:   Lab Results   Component Value Date     09/16/2023    K 4.4 09/16/2023     09/16/2023    CO2 32 09/16/2023    BUN 34 (H) 09/16/2023    CREATININE 1.15 (H) 09/16/2023    GLUCOSE 111 (H) 09/16/2023    CALCIUM 8.4 (L)  09/16/2023     Assessment:    1.  Status post TAVR.  Doing well postop.  Trivial leak of the aortic valve prosthesis.  No murmur of aortic regurgitation by exam.    2.  Coronary artery disease.  Mild by cardiac catheterization.    3.  Hyperlipidemia.  Cholesterol 166, HDL 56, LDL 92.  There is no strong data to indicate that aggressive statin therapy in this age group makes a difference.    4.  Hypertension.  Most recent labs show potassium 4.1, BUN 33, creatinine 1.1.

## 2023-10-23 ENCOUNTER — LAB (OUTPATIENT)
Dept: LAB | Facility: LAB | Age: 88
End: 2023-10-23
Payer: MEDICARE

## 2023-10-23 ENCOUNTER — OFFICE VISIT (OUTPATIENT)
Dept: CARDIOLOGY | Facility: CLINIC | Age: 88
End: 2023-10-23
Payer: MEDICARE

## 2023-10-23 VITALS
WEIGHT: 199 LBS | HEIGHT: 60 IN | SYSTOLIC BLOOD PRESSURE: 146 MMHG | DIASTOLIC BLOOD PRESSURE: 70 MMHG | BODY MASS INDEX: 39.07 KG/M2 | HEART RATE: 77 BPM

## 2023-10-23 DIAGNOSIS — Z95.2 S/P TAVR (TRANSCATHETER AORTIC VALVE REPLACEMENT): ICD-10-CM

## 2023-10-23 DIAGNOSIS — E78.2 MIXED HYPERLIPIDEMIA: ICD-10-CM

## 2023-10-23 DIAGNOSIS — I50.32 CHRONIC DIASTOLIC CONGESTIVE HEART FAILURE (MULTI): ICD-10-CM

## 2023-10-23 DIAGNOSIS — I25.10 CORONARY ARTERY DISEASE INVOLVING NATIVE CORONARY ARTERY OF NATIVE HEART WITHOUT ANGINA PECTORIS: ICD-10-CM

## 2023-10-23 DIAGNOSIS — I10 PRIMARY HYPERTENSION: ICD-10-CM

## 2023-10-23 DIAGNOSIS — R00.1 SINUS BRADYCARDIA: ICD-10-CM

## 2023-10-23 DIAGNOSIS — I87.2 VENOUS INSUFFICIENCY: ICD-10-CM

## 2023-10-23 DIAGNOSIS — I35.0 NONRHEUMATIC AORTIC VALVE STENOSIS: ICD-10-CM

## 2023-10-23 DIAGNOSIS — I50.9 CHRONIC HEART FAILURE, UNSPECIFIED HEART FAILURE TYPE (MULTI): Primary | ICD-10-CM

## 2023-10-23 DIAGNOSIS — I25.10 CORONARY ARTERY DISEASE INVOLVING NATIVE CORONARY ARTERY OF NATIVE HEART WITHOUT ANGINA PECTORIS: Primary | ICD-10-CM

## 2023-10-23 LAB
ALBUMIN SERPL BCP-MCNC: 3.8 G/DL (ref 3.4–5)
ALP SERPL-CCNC: 72 U/L (ref 33–136)
ALT SERPL W P-5'-P-CCNC: 17 U/L (ref 7–45)
ANION GAP SERPL CALC-SCNC: 13 MMOL/L (ref 10–20)
AST SERPL W P-5'-P-CCNC: 19 U/L (ref 9–39)
BILIRUB SERPL-MCNC: 0.4 MG/DL (ref 0–1.2)
BNP SERPL-MCNC: 54 PG/ML (ref 0–99)
BUN SERPL-MCNC: 36 MG/DL (ref 6–23)
CALCIUM SERPL-MCNC: 9.3 MG/DL (ref 8.6–10.6)
CHLORIDE SERPL-SCNC: 105 MMOL/L (ref 98–107)
CHOLEST SERPL-MCNC: 150 MG/DL (ref 0–199)
CHOLESTEROL/HDL RATIO: 2.9
CO2 SERPL-SCNC: 28 MMOL/L (ref 21–32)
CREAT SERPL-MCNC: 1.15 MG/DL (ref 0.5–1.05)
ERYTHROCYTE [DISTWIDTH] IN BLOOD BY AUTOMATED COUNT: 14.3 % (ref 11.5–14.5)
GFR SERPL CREATININE-BSD FRML MDRD: 46 ML/MIN/1.73M*2
GLUCOSE SERPL-MCNC: 92 MG/DL (ref 74–99)
HCT VFR BLD AUTO: 32.8 % (ref 36–46)
HDLC SERPL-MCNC: 51.2 MG/DL
HGB BLD-MCNC: 10 G/DL (ref 12–16)
LDLC SERPL CALC-MCNC: 78 MG/DL
MCH RBC QN AUTO: 28.2 PG (ref 26–34)
MCHC RBC AUTO-ENTMCNC: 30.5 G/DL (ref 32–36)
MCV RBC AUTO: 93 FL (ref 80–100)
NON HDL CHOLESTEROL: 99 MG/DL (ref 0–149)
NRBC BLD-RTO: 0 /100 WBCS (ref 0–0)
PLATELET # BLD AUTO: 150 X10*3/UL (ref 150–450)
PMV BLD AUTO: 11.2 FL (ref 7.5–11.5)
POTASSIUM SERPL-SCNC: 4.4 MMOL/L (ref 3.5–5.3)
PROT SERPL-MCNC: 6.5 G/DL (ref 6.4–8.2)
RBC # BLD AUTO: 3.54 X10*6/UL (ref 4–5.2)
SODIUM SERPL-SCNC: 142 MMOL/L (ref 136–145)
TRIGL SERPL-MCNC: 102 MG/DL (ref 0–149)
VLDL: 20 MG/DL (ref 0–40)
WBC # BLD AUTO: 5 X10*3/UL (ref 4.4–11.3)

## 2023-10-23 PROCEDURE — 36415 COLL VENOUS BLD VENIPUNCTURE: CPT

## 2023-10-23 PROCEDURE — 1159F MED LIST DOCD IN RCRD: CPT | Performed by: INTERNAL MEDICINE

## 2023-10-23 PROCEDURE — 99214 OFFICE O/P EST MOD 30 MIN: CPT | Performed by: INTERNAL MEDICINE

## 2023-10-23 PROCEDURE — 85027 COMPLETE CBC AUTOMATED: CPT

## 2023-10-23 PROCEDURE — 80053 COMPREHEN METABOLIC PANEL: CPT

## 2023-10-23 PROCEDURE — 1036F TOBACCO NON-USER: CPT | Performed by: INTERNAL MEDICINE

## 2023-10-23 PROCEDURE — 1125F AMNT PAIN NOTED PAIN PRSNT: CPT | Performed by: INTERNAL MEDICINE

## 2023-10-23 PROCEDURE — 3077F SYST BP >= 140 MM HG: CPT | Performed by: INTERNAL MEDICINE

## 2023-10-23 PROCEDURE — 83880 ASSAY OF NATRIURETIC PEPTIDE: CPT

## 2023-10-23 PROCEDURE — 80061 LIPID PANEL: CPT

## 2023-10-23 PROCEDURE — 1160F RVW MEDS BY RX/DR IN RCRD: CPT | Performed by: INTERNAL MEDICINE

## 2023-10-23 PROCEDURE — 1158F ADVNC CARE PLAN TLK DOCD: CPT | Performed by: INTERNAL MEDICINE

## 2023-10-23 PROCEDURE — 3078F DIAST BP <80 MM HG: CPT | Performed by: INTERNAL MEDICINE

## 2023-10-23 RX ORDER — TORSEMIDE 10 MG/1
10 TABLET ORAL DAILY
Qty: 90 TABLET | Refills: 3 | Status: SHIPPED | OUTPATIENT
Start: 2023-10-23 | End: 2024-03-27 | Stop reason: SDUPTHER

## 2023-10-23 NOTE — PATIENT INSTRUCTIONS
We need you to get some blood tests.    Reduce the torsemide to 10 mg daily    We will do an echocardiogram in 6 months on your next visit.

## 2024-03-27 ENCOUNTER — TELEPHONE (OUTPATIENT)
Dept: PRIMARY CARE | Facility: CLINIC | Age: 89
End: 2024-03-27
Payer: MEDICARE

## 2024-03-27 DIAGNOSIS — J44.9 MODERATE COPD (CHRONIC OBSTRUCTIVE PULMONARY DISEASE) (MULTI): ICD-10-CM

## 2024-03-27 DIAGNOSIS — I10 PRIMARY HYPERTENSION: ICD-10-CM

## 2024-03-27 DIAGNOSIS — I50.9 CHRONIC HEART FAILURE, UNSPECIFIED HEART FAILURE TYPE (MULTI): ICD-10-CM

## 2024-03-27 DIAGNOSIS — E78.2 MIXED HYPERLIPIDEMIA: ICD-10-CM

## 2024-03-27 RX ORDER — SIMVASTATIN 20 MG/1
20 TABLET, FILM COATED ORAL DAILY
Qty: 90 TABLET | Refills: 3 | Status: SHIPPED | OUTPATIENT
Start: 2024-03-27 | End: 2025-03-27

## 2024-03-27 RX ORDER — LOSARTAN POTASSIUM 100 MG/1
100 TABLET ORAL DAILY
Qty: 90 TABLET | Refills: 3 | Status: SHIPPED | OUTPATIENT
Start: 2024-03-27 | End: 2025-03-27

## 2024-03-27 RX ORDER — ALBUTEROL SULFATE 90 UG/1
2 AEROSOL, METERED RESPIRATORY (INHALATION) EVERY 4 HOURS PRN
Qty: 18 G | Refills: 3 | Status: SHIPPED | OUTPATIENT
Start: 2024-03-27 | End: 2025-03-27

## 2024-03-27 RX ORDER — TORSEMIDE 10 MG/1
10 TABLET ORAL DAILY
Qty: 90 TABLET | Refills: 3 | Status: SHIPPED | OUTPATIENT
Start: 2024-03-27 | End: 2024-05-13 | Stop reason: SDUPTHER

## 2024-03-27 NOTE — TELEPHONE ENCOUNTER
Chelsey called requesting refills for   Losartan 100mg tab, Simvastatin 20mg, torsemide 10mg, Albuterol and she stated she started taking a new rx which is Fluticasone - salmeterol twice a day    [facility]

## 2024-05-13 ENCOUNTER — OFFICE VISIT (OUTPATIENT)
Dept: CARDIOLOGY | Facility: CLINIC | Age: 89
End: 2024-05-13
Payer: MEDICARE

## 2024-05-13 ENCOUNTER — HOSPITAL ENCOUNTER (OUTPATIENT)
Dept: CARDIOLOGY | Facility: CLINIC | Age: 89
Discharge: HOME | End: 2024-05-13
Payer: MEDICARE

## 2024-05-13 VITALS
WEIGHT: 192 LBS | SYSTOLIC BLOOD PRESSURE: 140 MMHG | DIASTOLIC BLOOD PRESSURE: 60 MMHG | BODY MASS INDEX: 37.5 KG/M2 | HEART RATE: 75 BPM | OXYGEN SATURATION: 93 %

## 2024-05-13 DIAGNOSIS — I25.10 CORONARY ARTERY DISEASE INVOLVING NATIVE CORONARY ARTERY OF NATIVE HEART WITHOUT ANGINA PECTORIS: ICD-10-CM

## 2024-05-13 DIAGNOSIS — I35.0 NONRHEUMATIC AORTIC VALVE STENOSIS: ICD-10-CM

## 2024-05-13 DIAGNOSIS — I50.9 CHRONIC HEART FAILURE, UNSPECIFIED HEART FAILURE TYPE (MULTI): ICD-10-CM

## 2024-05-13 DIAGNOSIS — I87.2 VENOUS INSUFFICIENCY: Primary | ICD-10-CM

## 2024-05-13 DIAGNOSIS — E78.2 MIXED HYPERLIPIDEMIA: ICD-10-CM

## 2024-05-13 DIAGNOSIS — I50.32 CHRONIC DIASTOLIC CONGESTIVE HEART FAILURE (MULTI): ICD-10-CM

## 2024-05-13 DIAGNOSIS — I10 PRIMARY HYPERTENSION: ICD-10-CM

## 2024-05-13 DIAGNOSIS — Z95.2 S/P TAVR (TRANSCATHETER AORTIC VALVE REPLACEMENT): ICD-10-CM

## 2024-05-13 PROCEDURE — 93306 TTE W/DOPPLER COMPLETE: CPT | Performed by: INTERNAL MEDICINE

## 2024-05-13 PROCEDURE — 99214 OFFICE O/P EST MOD 30 MIN: CPT | Performed by: INTERNAL MEDICINE

## 2024-05-13 PROCEDURE — 3077F SYST BP >= 140 MM HG: CPT | Performed by: INTERNAL MEDICINE

## 2024-05-13 PROCEDURE — 93306 TTE W/DOPPLER COMPLETE: CPT

## 2024-05-13 PROCEDURE — 1159F MED LIST DOCD IN RCRD: CPT | Performed by: INTERNAL MEDICINE

## 2024-05-13 PROCEDURE — 1036F TOBACCO NON-USER: CPT | Performed by: INTERNAL MEDICINE

## 2024-05-13 PROCEDURE — 3078F DIAST BP <80 MM HG: CPT | Performed by: INTERNAL MEDICINE

## 2024-05-13 RX ORDER — FLUTICASONE PROPIONATE AND SALMETEROL 250; 50 UG/1; UG/1
POWDER RESPIRATORY (INHALATION)
COMMUNITY
Start: 2024-04-30

## 2024-05-13 RX ORDER — TORSEMIDE 5 MG/1
5 TABLET ORAL DAILY
Qty: 90 TABLET | Refills: 3 | Status: SHIPPED | OUTPATIENT
Start: 2024-05-13

## 2024-05-13 ASSESSMENT — ENCOUNTER SYMPTOMS
CONSTIPATION: 1
DYSPNEA ON EXERTION: 1

## 2024-05-13 NOTE — PATIENT INSTRUCTIONS
Reduce the torsemide to 5 mg daily.  If your legs start swelling you can go back  to the 10 mg dose.    Your echocardiogram shows normal heart function.  The new valve is working beautifully!    For constipation we recommend Metamucil.  You cannot take this with your pills.    If your leg becomes worse, call us and we will send you to an orthopedic specialist.

## 2024-05-13 NOTE — PROGRESS NOTES
Subjective   Chelsey Cazares is a 89 y.o. female.    Chief Complaint:  Follow-up transaortic valve replacement.    HPI    Over the past 6 months she has had no symptoms of angina.  No increased dyspnea or shortness of breath or dyspnea with exertion.  No increased peripheral edema.  Complains of constipation.    The patient's TAVR procedure was performed on September 16, 2023.  She had a #26 valve placed.  Postprocedure she had no complications.     Cardiac catheterization in June 2023 demonstrated mild coronary artery disease.  She did have pulmonary hypertension.  Elevated left ventricular end-diastolic pressures.    As part of her work-up she had a CT scan of the abdomen.  This showed incidental finding of gallstones.     On April 17, 2021, she was hospitalized when she presented with respiratory failure and a syncopal event. The patient states she was bringing her groceries and putting them away. She became suddenly very weak and dizzy and lightheaded. She slumped to the floor and the rescue squad was called. At that point she lost consciousness. She was brought to the hospital where she is noted to be in respiratory failure. Her oxygen saturations were 75%.     Past cardiac history of diastolic congestive heart failure and aortic stenosis.     Past medical history: Significant for underlying chronic obstructive lung disease and renal insufficiency.     Long history of smoking 1 pack/day. She has been able to quit smoking.      Allergies  None Known    · No Known Allergies     Family History  Brother    · Family history of malignant neoplasm (V16.9) (Z80.9)     Social History  Problems    · Denies alcohol consumption (V49.89) (Z78.9)   · Former smoker (V15.82) (Z87.891)   · No alcohol use   · Single    Review of Systems   Cardiovascular:  Positive for dyspnea on exertion.   Musculoskeletal:  Positive for arthritis.   Gastrointestinal:  Positive for constipation.   All other systems reviewed and are  negative.      Current Outpatient Medications   Medication Sig Dispense Refill    albuterol 90 mcg/actuation inhaler Inhale 2 puffs every 4 hours if needed for wheezing. 18 g 3    aspirin 81 mg EC tablet Take 1 tablet (81 mg) by mouth once daily.      calcium carbonate 600 mg calcium (1,500 mg) tablet Take 2 tablets (1,200 mg) by mouth once daily.      cholecalciferol (Vitamin D-3) 5,000 Units tablet Take 1 tablet (5,000 Units) by mouth once daily.      coenzyme Q10-vitamin E 100-5 mg-unit capsule Take 1 capsule by mouth once daily.      fish oil concentrate (Omega-3) 120-180 mg capsule Take 1 capsule (1 g) by mouth once daily.      fluticasone propion-salmeteroL (Advair Diskus) 250-50 mcg/dose diskus inhaler use 1 inhalation every 12 hours, Rinse mouth well after use.      glucosamine HCl 1,500 mg tablet Take by mouth.      losartan (Cozaar) 100 mg tablet Take 1 tablet (100 mg) by mouth once daily. 90 tablet 3    multivitamin tablet Take 1 tablet by mouth once daily.      potassium chloride CR (Klor-Con M10) 10 mEq ER tablet Take 1 tablet (10 mEq) by mouth once daily.      pyridoxine-kelp-lect-vinegar tablet Take 1 tablet by mouth once daily.      simvastatin (Zocor) 20 mg tablet Take 1 tablet (20 mg) by mouth once daily. 90 tablet 3    Spiriva Respimat 2.5 mcg/actuation inhaler Inhale 2 puffs once daily. 3 each 3    vitamin B complex/folic acid (B-STRESS ORAL) Take 1 capsule by mouth once daily.      torsemide (Demadex) 5 mg tablet Take 1 tablet (5 mg) by mouth once daily. 90 tablet 3     No current facility-administered medications for this visit.        Visit Vitals  /60 (BP Location: Left arm)   Pulse 75   Wt 87.1 kg (192 lb)   SpO2 93%   BMI 37.50 kg/m²   Smoking Status Never   BSA 1.92 m²        Objective     Constitutional:       Appearance: Not in distress.   Neck:      Vascular: JVD normal.   Pulmonary:      Breath sounds: Normal breath sounds.   Cardiovascular:      Normal rate. Regular rhythm.  Normal S1. Normal S2.       Murmurs: There is a grade 1/6 systolic murmur.      No gallop.    Pulses:     Intact distal pulses.   Edema:     Peripheral edema absent.   Abdominal:      General: There is no distension.      Palpations: Abdomen is soft.   Neurological:      Mental Status: Alert.         Lab Review:   Lab Results   Component Value Date     10/23/2023    K 4.4 10/23/2023     10/23/2023    CO2 28 10/23/2023    BUN 36 (H) 10/23/2023    CREATININE 1.15 (H) 10/23/2023    GLUCOSE 92 10/23/2023    CALCIUM 9.3 10/23/2023     Lab Results   Component Value Date    CHOL 150 10/23/2023    TRIG 102 10/23/2023    HDL 51.2 10/23/2023       Assessment:    1.  Status post transaortic valve replacement.  Today's echocardiographic study shows a normally functioning bioprosthetic aortic valve with a trivial perivalvular leak.  Will continue medical management.  No do echo studies on a yearly basis.    2.  Coronary artery disease.  Mild by cardiac catheterization.    3.  Hyperlipidemia.  Cholesterol 150, HDL 51, LDL 78.  I feel that this is an adequate profile.    4.  Hypertension.  Systolic blood pressure mildly elevated but diastolic blood pressure is low.    5.  Constipation.  Will try to cut back on her diuretics.     6.  Chronic diastolic congestive heart failure.  Will cut back on her diuretics as her latest BNP is 54.

## 2024-05-14 LAB
AORTIC VALVE MEAN GRADIENT: 8.6 MMHG
AORTIC VALVE PEAK VELOCITY: 1.95 M/S
AV PEAK GRADIENT: 15.2 MMHG
AVA (PEAK VEL): 1.8 CM2
AVA (VTI): 1.76 CM2
EJECTION FRACTION APICAL 4 CHAMBER: 70.6
LEFT ATRIUM VOLUME AREA LENGTH INDEX BSA: 40.6 ML/M2
LEFT VENTRICULAR OUTFLOW TRACT DIAMETER: 1.87 CM
LV EJECTION FRACTION BIPLANE: 70 %
MITRAL VALVE E/A RATIO: 0.71
MITRAL VALVE E/E' RATIO: 27.72
RIGHT VENTRICLE FREE WALL PEAK S': 11 CM/S
TRICUSPID ANNULAR PLANE SYSTOLIC EXCURSION: 2.3 CM

## 2024-08-19 ENCOUNTER — APPOINTMENT (OUTPATIENT)
Dept: PRIMARY CARE | Facility: CLINIC | Age: 89
End: 2024-08-19
Payer: MEDICARE

## 2024-08-19 ENCOUNTER — LAB (OUTPATIENT)
Dept: LAB | Facility: LAB | Age: 89
End: 2024-08-19
Payer: MEDICARE

## 2024-08-19 VITALS
TEMPERATURE: 97.1 F | HEIGHT: 60 IN | HEART RATE: 49 BPM | SYSTOLIC BLOOD PRESSURE: 136 MMHG | WEIGHT: 182.2 LBS | BODY MASS INDEX: 35.77 KG/M2 | OXYGEN SATURATION: 96 % | DIASTOLIC BLOOD PRESSURE: 60 MMHG

## 2024-08-19 DIAGNOSIS — N18.31 CHRONIC RENAL IMPAIRMENT, STAGE 3A (MULTI): ICD-10-CM

## 2024-08-19 DIAGNOSIS — R53.82 CHRONIC FATIGUE: ICD-10-CM

## 2024-08-19 DIAGNOSIS — I73.9 PERIPHERAL VASCULAR DISEASE, UNSPECIFIED (CMS-HCC): ICD-10-CM

## 2024-08-19 DIAGNOSIS — M25.552 LEFT HIP PAIN: ICD-10-CM

## 2024-08-19 DIAGNOSIS — R19.04 ABDOMINAL WALL MASS OF LEFT LOWER QUADRANT: ICD-10-CM

## 2024-08-19 DIAGNOSIS — R35.0 URINARY FREQUENCY: ICD-10-CM

## 2024-08-19 DIAGNOSIS — E55.9 VITAMIN D DEFICIENCY: ICD-10-CM

## 2024-08-19 DIAGNOSIS — E78.2 MIXED HYPERLIPIDEMIA: ICD-10-CM

## 2024-08-19 DIAGNOSIS — I10 PRIMARY HYPERTENSION: ICD-10-CM

## 2024-08-19 DIAGNOSIS — J44.9 MODERATE COPD (CHRONIC OBSTRUCTIVE PULMONARY DISEASE) (MULTI): ICD-10-CM

## 2024-08-19 DIAGNOSIS — E66.01 MORBID (SEVERE) OBESITY DUE TO EXCESS CALORIES (MULTI): ICD-10-CM

## 2024-08-19 DIAGNOSIS — J96.11 CHRONIC RESPIRATORY FAILURE WITH HYPOXIA (MULTI): ICD-10-CM

## 2024-08-19 DIAGNOSIS — Z00.00 ROUTINE GENERAL MEDICAL EXAMINATION AT HEALTH CARE FACILITY: Primary | ICD-10-CM

## 2024-08-19 LAB
25(OH)D3 SERPL-MCNC: 90 NG/ML (ref 30–100)
ALBUMIN SERPL BCP-MCNC: 4.2 G/DL (ref 3.4–5)
ALP SERPL-CCNC: 67 U/L (ref 33–136)
ALT SERPL W P-5'-P-CCNC: 18 U/L (ref 7–45)
ANION GAP SERPL CALC-SCNC: 15 MMOL/L (ref 10–20)
APPEARANCE UR: CLEAR
AST SERPL W P-5'-P-CCNC: 18 U/L (ref 9–39)
BACTERIA #/AREA URNS AUTO: ABNORMAL /HPF
BASOPHILS # BLD AUTO: 0.03 X10*3/UL (ref 0–0.1)
BASOPHILS NFR BLD AUTO: 0.6 %
BILIRUB SERPL-MCNC: 0.7 MG/DL (ref 0–1.2)
BILIRUB UR STRIP.AUTO-MCNC: NEGATIVE MG/DL
BUN SERPL-MCNC: 36 MG/DL (ref 6–23)
CALCIUM SERPL-MCNC: 9.5 MG/DL (ref 8.6–10.6)
CHLORIDE SERPL-SCNC: 102 MMOL/L (ref 98–107)
CHOLEST SERPL-MCNC: 197 MG/DL (ref 0–199)
CHOLESTEROL/HDL RATIO: 2.7
CO2 SERPL-SCNC: 29 MMOL/L (ref 21–32)
COLOR UR: ABNORMAL
CREAT SERPL-MCNC: 1.18 MG/DL (ref 0.5–1.05)
CREAT UR-MCNC: 77.3 MG/DL (ref 20–320)
EGFRCR SERPLBLD CKD-EPI 2021: 44 ML/MIN/1.73M*2
EOSINOPHIL # BLD AUTO: 0.09 X10*3/UL (ref 0–0.4)
EOSINOPHIL NFR BLD AUTO: 1.8 %
ERYTHROCYTE [DISTWIDTH] IN BLOOD BY AUTOMATED COUNT: 14.1 % (ref 11.5–14.5)
GLUCOSE SERPL-MCNC: 100 MG/DL (ref 74–99)
GLUCOSE UR STRIP.AUTO-MCNC: NORMAL MG/DL
HCT VFR BLD AUTO: 37.9 % (ref 36–46)
HDLC SERPL-MCNC: 71.9 MG/DL
HGB BLD-MCNC: 12.1 G/DL (ref 12–16)
IMM GRANULOCYTES # BLD AUTO: 0.01 X10*3/UL (ref 0–0.5)
IMM GRANULOCYTES NFR BLD AUTO: 0.2 % (ref 0–0.9)
KETONES UR STRIP.AUTO-MCNC: NEGATIVE MG/DL
LDLC SERPL CALC-MCNC: 110 MG/DL
LEUKOCYTE ESTERASE UR QL STRIP.AUTO: ABNORMAL
LYMPHOCYTES # BLD AUTO: 1.18 X10*3/UL (ref 0.8–3)
LYMPHOCYTES NFR BLD AUTO: 23 %
MCH RBC QN AUTO: 28.7 PG (ref 26–34)
MCHC RBC AUTO-ENTMCNC: 31.9 G/DL (ref 32–36)
MCV RBC AUTO: 90 FL (ref 80–100)
MICROALBUMIN UR-MCNC: 12.1 MG/L
MICROALBUMIN/CREAT UR: 15.7 UG/MG CREAT
MONOCYTES # BLD AUTO: 0.5 X10*3/UL (ref 0.05–0.8)
MONOCYTES NFR BLD AUTO: 9.8 %
MUCOUS THREADS #/AREA URNS AUTO: ABNORMAL /LPF
NEUTROPHILS # BLD AUTO: 3.31 X10*3/UL (ref 1.6–5.5)
NEUTROPHILS NFR BLD AUTO: 64.6 %
NITRITE UR QL STRIP.AUTO: NEGATIVE
NON HDL CHOLESTEROL: 125 MG/DL (ref 0–149)
NRBC BLD-RTO: 0 /100 WBCS (ref 0–0)
PH UR STRIP.AUTO: 5.5 [PH]
PLATELET # BLD AUTO: 177 X10*3/UL (ref 150–450)
POTASSIUM SERPL-SCNC: 4.7 MMOL/L (ref 3.5–5.3)
PROT SERPL-MCNC: 7.1 G/DL (ref 6.4–8.2)
PROT UR STRIP.AUTO-MCNC: NEGATIVE MG/DL
RBC # BLD AUTO: 4.21 X10*6/UL (ref 4–5.2)
RBC # UR STRIP.AUTO: NEGATIVE /UL
RBC #/AREA URNS AUTO: ABNORMAL /HPF
SODIUM SERPL-SCNC: 141 MMOL/L (ref 136–145)
SP GR UR STRIP.AUTO: 1.02
SQUAMOUS #/AREA URNS AUTO: ABNORMAL /HPF
TRIGL SERPL-MCNC: 76 MG/DL (ref 0–149)
UROBILINOGEN UR STRIP.AUTO-MCNC: NORMAL MG/DL
VIT B12 SERPL-MCNC: 652 PG/ML (ref 211–911)
VLDL: 15 MG/DL (ref 0–40)
WBC # BLD AUTO: 5.1 X10*3/UL (ref 4.4–11.3)
WBC #/AREA URNS AUTO: ABNORMAL /HPF
WBC CLUMPS #/AREA URNS AUTO: ABNORMAL /HPF

## 2024-08-19 PROCEDURE — 99397 PER PM REEVAL EST PAT 65+ YR: CPT | Performed by: FAMILY MEDICINE

## 2024-08-19 PROCEDURE — 3078F DIAST BP <80 MM HG: CPT | Performed by: FAMILY MEDICINE

## 2024-08-19 PROCEDURE — 1170F FXNL STATUS ASSESSED: CPT | Performed by: FAMILY MEDICINE

## 2024-08-19 PROCEDURE — 82043 UR ALBUMIN QUANTITATIVE: CPT

## 2024-08-19 PROCEDURE — 80053 COMPREHEN METABOLIC PANEL: CPT

## 2024-08-19 PROCEDURE — 1125F AMNT PAIN NOTED PAIN PRSNT: CPT | Performed by: FAMILY MEDICINE

## 2024-08-19 PROCEDURE — G0444 DEPRESSION SCREEN ANNUAL: HCPCS | Performed by: FAMILY MEDICINE

## 2024-08-19 PROCEDURE — 85025 COMPLETE CBC W/AUTO DIFF WBC: CPT

## 2024-08-19 PROCEDURE — 80061 LIPID PANEL: CPT

## 2024-08-19 PROCEDURE — 82607 VITAMIN B-12: CPT

## 2024-08-19 PROCEDURE — 87186 SC STD MICRODIL/AGAR DIL: CPT

## 2024-08-19 PROCEDURE — 81001 URINALYSIS AUTO W/SCOPE: CPT

## 2024-08-19 PROCEDURE — 82306 VITAMIN D 25 HYDROXY: CPT

## 2024-08-19 PROCEDURE — 87086 URINE CULTURE/COLONY COUNT: CPT

## 2024-08-19 PROCEDURE — 1159F MED LIST DOCD IN RCRD: CPT | Performed by: FAMILY MEDICINE

## 2024-08-19 PROCEDURE — G0439 PPPS, SUBSEQ VISIT: HCPCS | Performed by: FAMILY MEDICINE

## 2024-08-19 PROCEDURE — 1160F RVW MEDS BY RX/DR IN RCRD: CPT | Performed by: FAMILY MEDICINE

## 2024-08-19 PROCEDURE — 82570 ASSAY OF URINE CREATININE: CPT

## 2024-08-19 PROCEDURE — 3075F SYST BP GE 130 - 139MM HG: CPT | Performed by: FAMILY MEDICINE

## 2024-08-19 PROCEDURE — 36415 COLL VENOUS BLD VENIPUNCTURE: CPT

## 2024-08-19 PROCEDURE — 99497 ADVNCD CARE PLAN 30 MIN: CPT | Performed by: FAMILY MEDICINE

## 2024-08-19 PROCEDURE — 1036F TOBACCO NON-USER: CPT | Performed by: FAMILY MEDICINE

## 2024-08-19 ASSESSMENT — PATIENT HEALTH QUESTIONNAIRE - PHQ9
1. LITTLE INTEREST OR PLEASURE IN DOING THINGS: NOT AT ALL
SUM OF ALL RESPONSES TO PHQ9 QUESTIONS 1 AND 2: 0
2. FEELING DOWN, DEPRESSED OR HOPELESS: NOT AT ALL

## 2024-08-19 ASSESSMENT — ENCOUNTER SYMPTOMS
OCCASIONAL FEELINGS OF UNSTEADINESS: 0
LOSS OF SENSATION IN FEET: 0
DEPRESSION: 0

## 2024-08-19 ASSESSMENT — ACTIVITIES OF DAILY LIVING (ADL)
GROCERY_SHOPPING: INDEPENDENT
BATHING: INDEPENDENT
DOING_HOUSEWORK: INDEPENDENT
DRESSING: INDEPENDENT
MANAGING_FINANCES: INDEPENDENT
TAKING_MEDICATION: INDEPENDENT

## 2024-08-19 ASSESSMENT — PAIN SCALES - GENERAL: PAINLEVEL: 8

## 2024-08-19 NOTE — PROGRESS NOTES
Subjective   Reason for Visit: Chelsey Cazares is an 89 y.o. female here for a Medicare Wellness visit.     Past Medical, Surgical, and Family History reviewed and updated in chart.    Reviewed all medications by prescribing practitioner or clinical pharmacist (such as prescriptions, OTCs, herbal therapies and supplements) and documented in the medical record.    HPI  89-year-old female for Medicare wellness visit and complete physical exam.  She has a lump on her left lower abdomen that has been there since a heart procedure last year.  She has left hip pain that is worsening over the last year.  Patient Care Team:  Ney CHAKRABORTY DO as PCP - General  Ney CHAKRABORTY DO as PCP - [employer] PCP  Aguila Nelson MD as Consulting Physician (Cardiology)     Review of Systems  Constitutional: no chills, no fever and no night sweats.   Eyes: no blurred vision and no eyesight problems.   ENT: no hearing loss, no nasal congestion, no nasal discharge, no hoarseness and no sore throat.   Cardiovascular: no chest pain, no intermittent leg claudication, no lower extremity edema, no palpitations and no syncope.   Respiratory: no cough, no shortness of breath during exertion, no shortness of breath at rest and no wheezing.   Gastrointestinal: no abdominal pain, no blood in stools, no constipation, no diarrhea, no melena, no nausea, no rectal pain and no vomiting.   Genitourinary: no dysuria, no change in urinary frequency, no urinary hesitancy and no feelings of urinary urgency.   Neurological: no difficulty walking, no headache, no limb weakness, no numbness and no tingling.   Psychiatric: no anxiety, no depression, no anhedonia and no substance use disorders.   Endocrine: no recent weight gain and no recent weight loss.   Hematologic/Lymphatic: no tendency for easy bruising and no swollen glands.  Objective   Vitals:  /60 (BP Location: Left arm, Patient Position: Sitting, BP Cuff Size: Adult)    Pulse (!) 49   Temp 36.2 °C (97.1 °F) (Temporal)   Ht 1.524 m (5')   Wt 82.6 kg (182 lb 3.2 oz)   SpO2 96%   BMI 35.58 kg/m²       Physical Exam  Constitutional: Alert and in no acute distress. Well developed, well nourished.   Eyes: Pupils were equal in size, round, reactive to light (PERRL) with normal accommodation and extraocular movements intact (EOMI). Ophthalmoscopic examination: Normal.   Ears, Nose, Mouth, and Throat: External inspection of ears and nose: Normal. Otoscopic examination: Normal. Lips, teeth, and gums: Normal. Oropharynx: Normal.   Neck: No neck mass was observed. Supple. Thyroid not enlarged and there were no palpable thyroid nodules.   Cardiovascular: Heart rate and rhythm were normal, normal S1 and S2, no gallops, no murmurs and no pericardial rub. Carotid pulses: Normal with no bruits. Abdominal aorta: Normal. Pedal pulses: Normal. No peripheral edema.   Pulmonary: No respiratory distress. Clear bilateral breath sounds.   Abdomen: Soft nontender; no abdominal mass palpated. Normal bowel sounds. No organomegaly.   Skin: Normal skin color and pigmentation, normal skin turgor, and no rash.   Psychiatric: Judgment and insight: Intact. Mood and affect: Normal.  Assessment/Plan   Problem List Items Addressed This Visit             ICD-10-CM    Routine general medical examination at health care facility - Primary Z00.00    Relevant Orders    1 Year Follow Up In Primary Care - Wellness Exam    Chronic respiratory failure with hypoxia (Multi) J96.11    Chronic renal impairment, stage 3a (Multi) N18.31    Relevant Orders    Albumin-Creatinine Ratio, Urine Random    Mixed hyperlipidemia E78.2    Primary hypertension I10    Relevant Orders    Lipid Panel    Comprehensive Metabolic Panel    CBC and Auto Differential    Urinalysis with Reflex Microscopic    Vitamin D deficiency E55.9    Relevant Orders    Vitamin D 25-Hydroxy,Total (for eval of Vitamin D levels)    Moderate COPD (chronic  obstructive pulmonary disease) (Multi) J44.9    Chronic fatigue R53.82    Relevant Orders    Vitamin B12    Urinary frequency R35.0    Relevant Orders    Urine Culture    Peripheral vascular disease, unspecified (CMS-HCC) I73.9    Morbid (severe) obesity due to excess calories (Multi) E66.01    Abdominal wall mass of left lower quadrant R19.04    Relevant Orders    US abdomen limited    Left hip pain M25.552    Relevant Orders    Referral to Orthopaedic Surgery         Advance Directives Discussion  16 - 20 minutes were spent discussing Advanced Care Planning (including a Living Will, Medical Power Of , as well as specific end of life choices and/or directives). The details of that discussion were documented in Advanced Directives Discussion section of the medical record.     Depression Screening  10 - 15 minutes were spent screening for depression.

## 2024-08-20 ENCOUNTER — HOSPITAL ENCOUNTER (OUTPATIENT)
Dept: RADIOLOGY | Facility: CLINIC | Age: 89
Discharge: HOME | End: 2024-08-20
Payer: MEDICARE

## 2024-08-20 DIAGNOSIS — R19.04 ABDOMINAL WALL MASS OF LEFT LOWER QUADRANT: ICD-10-CM

## 2024-08-20 PROCEDURE — 76705 ECHO EXAM OF ABDOMEN: CPT

## 2024-08-20 PROCEDURE — 76705 ECHO EXAM OF ABDOMEN: CPT | Performed by: STUDENT IN AN ORGANIZED HEALTH CARE EDUCATION/TRAINING PROGRAM

## 2024-08-21 ENCOUNTER — OFFICE VISIT (OUTPATIENT)
Dept: ORTHOPEDIC SURGERY | Facility: CLINIC | Age: 89
End: 2024-08-21
Payer: MEDICARE

## 2024-08-21 ENCOUNTER — HOSPITAL ENCOUNTER (OUTPATIENT)
Dept: RADIOLOGY | Facility: CLINIC | Age: 89
Discharge: HOME | End: 2024-08-21
Payer: MEDICARE

## 2024-08-21 VITALS — WEIGHT: 180 LBS | BODY MASS INDEX: 35.34 KG/M2 | HEIGHT: 60 IN

## 2024-08-21 DIAGNOSIS — M25.552 LEFT HIP PAIN: ICD-10-CM

## 2024-08-21 DIAGNOSIS — N30.00 ACUTE CYSTITIS WITHOUT HEMATURIA: Primary | ICD-10-CM

## 2024-08-21 DIAGNOSIS — M70.62 TROCHANTERIC BURSITIS, LEFT HIP: Primary | ICD-10-CM

## 2024-08-21 LAB — BACTERIA UR CULT: ABNORMAL

## 2024-08-21 PROCEDURE — 1159F MED LIST DOCD IN RCRD: CPT | Performed by: ORTHOPAEDIC SURGERY

## 2024-08-21 PROCEDURE — 1036F TOBACCO NON-USER: CPT | Performed by: ORTHOPAEDIC SURGERY

## 2024-08-21 PROCEDURE — 73502 X-RAY EXAM HIP UNI 2-3 VIEWS: CPT | Mod: LT

## 2024-08-21 PROCEDURE — 20610 DRAIN/INJ JOINT/BURSA W/O US: CPT | Performed by: ORTHOPAEDIC SURGERY

## 2024-08-21 PROCEDURE — 99203 OFFICE O/P NEW LOW 30 MIN: CPT | Performed by: ORTHOPAEDIC SURGERY

## 2024-08-21 PROCEDURE — 1160F RVW MEDS BY RX/DR IN RCRD: CPT | Performed by: ORTHOPAEDIC SURGERY

## 2024-08-21 RX ORDER — CIPROFLOXACIN 250 MG/1
250 TABLET, FILM COATED ORAL 2 TIMES DAILY
Qty: 14 TABLET | Refills: 0 | Status: SHIPPED | OUTPATIENT
Start: 2024-08-21 | End: 2024-08-28

## 2024-08-21 RX ORDER — LIDOCAINE HYDROCHLORIDE 10 MG/ML
2 INJECTION INFILTRATION; PERINEURAL
Status: COMPLETED | OUTPATIENT
Start: 2024-08-21 | End: 2024-08-21

## 2024-08-21 RX ORDER — TRIAMCINOLONE ACETONIDE 40 MG/ML
40 INJECTION, SUSPENSION INTRA-ARTICULAR; INTRAMUSCULAR
Status: COMPLETED | OUTPATIENT
Start: 2024-08-21 | End: 2024-08-21

## 2024-08-21 NOTE — PROGRESS NOTES
Subjective    Patient ID: Chelsey Cazares is a 89 y.o. female.    Chief Complaint: Pain of the Left Hip (No recent injury/+groin +chair/car)       This is a 89-year-old female who presents for evaluation of left hip pain.  Some of the pain is into the left groin but a majority the pain is into the lateral aspect of her left hip.  This has been ongoing for the past several months.  No specific injury.  Notes difficulty arising out of a chair and getting in and out of an automobile.  No trauma that she can recall.  The patient though does admit to have some degree of short-term memory issues.    This patient's past medical, social, and family history were reviewed as well as a review of systems including updates on the patient's information encounter sheet    Physical Examination  Constitutional: Patient's height and weight reviewed, appears well kempt, patient does have some difficulty with recalling her history with regard to left hip pain  Psychiatric: Alert and oriented ×3, appropriate mood and behavior  Pulmonary: Breathing appears nonlabored, no apparent distress  Lymphatic: No appreciable lymphadenopathy to both the upper and lower extremities  Skin: No open lesions, rashes, ulcerations  Neurologic: Gross motor and sensory exam appear intact (except for abnormalities noted in the below muscle skeletal exam)    Musculoskeletal: The left hip is well aligned without rotation deformity nor shortening.  Hip flexion is 100 degrees with internal rotation to 15 degrees external rotation to 25 degrees with only some mild groin pain elicited.  Patient notes difficulty rising out of a chair secondary to lateral hip pain.  There is significant localized tenderness overlying the prominence of the left greater trochanter    X-rays reviewed today demonstrate mild arthritic change of the left hip but also noted a significant calcific irregularity along the lateral aspect of the left greater trochanter.  On the AP view the  pelvis arthritic changes are also noted of the lumbar spine    Assessment: Left hip pain of likely multiple etiologies including trochanteric bursitis, lumbar radiculopathy and mild left hip arthritis    Plan: Extended discussion ensued with the patient regarding the above diagnosis and treatment options.  Her most significant finding on exam today there was a lateral tenderness overlying the prominence of the left greater trochanter.  Based on this we elect to go forward with a left hip trochanteric bursa injection of Kenalog which she tolerated well.  If she does not see improvement she will follow-up for reevaluation.      L Inj/Asp: L greater trochanteric bursa on 8/21/2024 11:00 AM  Indications: pain  Details: 22 G needle, lateral approach  Medications: 40 mg triamcinolone acetonide 40 mg/mL; 2 mL lidocaine 10 mg/mL (1 %)  Outcome: tolerated well, no immediate complications  Procedure, treatment alternatives, risks and benefits explained, specific risks discussed. Consent was given by the patient. Patient was prepped and draped in the usual sterile fashion.             Current Outpatient Medications:     albuterol 90 mcg/actuation inhaler, Inhale 2 puffs every 4 hours if needed for wheezing., Disp: 18 g, Rfl: 3    aspirin 81 mg EC tablet, Take 1 tablet (81 mg) by mouth once daily., Disp: , Rfl:     calcium carbonate 600 mg calcium (1,500 mg) tablet, Take 2 tablets (1,200 mg) by mouth once daily., Disp: , Rfl:     cholecalciferol (Vitamin D-3) 5,000 Units tablet, Take 1 tablet (5,000 Units) by mouth once daily., Disp: , Rfl:     coenzyme Q10-vitamin E 100-5 mg-unit capsule, Take 1 capsule by mouth once daily., Disp: , Rfl:     fish oil concentrate (Omega-3) 120-180 mg capsule, Take 1 capsule (1 g) by mouth once daily., Disp: , Rfl:     fluticasone propion-salmeteroL (Advair Diskus) 250-50 mcg/dose diskus inhaler, use 1 inhalation every 12 hours, Rinse mouth well after use., Disp: , Rfl:     glucosamine HCl 1,500  mg tablet, Take by mouth., Disp: , Rfl:     losartan (Cozaar) 100 mg tablet, Take 1 tablet (100 mg) by mouth once daily., Disp: 90 tablet, Rfl: 3    multivitamin tablet, Take 1 tablet by mouth once daily., Disp: , Rfl:     potassium chloride CR (Klor-Con M10) 10 mEq ER tablet, Take 1 tablet (10 mEq) by mouth once daily., Disp: , Rfl:     pyridoxine-kelp-lect-vinegar tablet, Take 1 tablet by mouth once daily., Disp: , Rfl:     simvastatin (Zocor) 20 mg tablet, Take 1 tablet (20 mg) by mouth once daily., Disp: 90 tablet, Rfl: 3    Spiriva Respimat 2.5 mcg/actuation inhaler, Inhale 2 puffs once daily., Disp: 3 each, Rfl: 3    torsemide (Demadex) 5 mg tablet, Take 1 tablet (5 mg) by mouth once daily., Disp: 90 tablet, Rfl: 3    vitamin B complex/folic acid (B-STRESS ORAL), Take 1 capsule by mouth once daily., Disp: , Rfl:

## 2024-09-13 ENCOUNTER — TELEMEDICINE (OUTPATIENT)
Dept: CARDIOLOGY | Facility: HOSPITAL | Age: 89
End: 2024-09-13
Payer: MEDICARE

## 2024-09-13 DIAGNOSIS — Z95.2 S/P TAVR (TRANSCATHETER AORTIC VALVE REPLACEMENT): Primary | ICD-10-CM

## 2024-09-13 PROCEDURE — 1036F TOBACCO NON-USER: CPT | Performed by: NURSE PRACTITIONER

## 2024-09-13 PROCEDURE — 1160F RVW MEDS BY RX/DR IN RCRD: CPT | Performed by: NURSE PRACTITIONER

## 2024-09-13 PROCEDURE — 1159F MED LIST DOCD IN RCRD: CPT | Performed by: NURSE PRACTITIONER

## 2024-09-13 PROCEDURE — 99213 OFFICE O/P EST LOW 20 MIN: CPT | Performed by: NURSE PRACTITIONER

## 2024-09-13 NOTE — PROGRESS NOTES
Structural Heart Follow up visit      Chelsey Cazares is a 89 y.o.  F who is one week s/p TAVR using 26mm Evolut FX via RFA on 9/15/23  presents for 1 yr follow up      reports SOB,ANDERSON, fatigue      patient with   Past Medical History:   Diagnosis Date    Encounter for immunization 05/10/2018    Need for vaccination with 13-polyvalent pneumococcal conjugate vaccine    Personal history of nicotine dependence     History of tobacco abuse    Personal history of other diseases of the respiratory system 01/24/2017    History of acute bronchitis with bronchospasm    Personal history of other endocrine, nutritional and metabolic disease     History of obesity       No results found for this or any previous visit (from the past 96 hour(s)).     Transthoracic echo (TTE) complete    Result Date: 5/14/2024               [facility], Echo Lab [address], [place] [address]             Tel [phone] and Fax [phone] TRANSTHORACIC ECHOCARDIOGRAM REPORT  Patient Name:      CHELSEY CAZARES       Reading Physician:    [id number] Aguila Nelson MD, [facility] Study Date:        5/13/2024            Ordering Provider:    [id number] AGUILA NELSON MRN/PID:           [id number]             Fellow: Accession#:        [id number]         Nurse: Date of Birth/Age: 2/10/1934 / 89 years Sonographer:          Kaykay Gaviria RDCS Gender:            F                    Additional Staff: Height:            152.40 cm            Admit Date: Weight:            90.26 kg             Admission Status:     Outpatient BSA / BMI:         1.86 m2 / 38.86      Encounter#:           [id number]                    kg/m2                                         Department Location:  [place]                                                               Echo Lab Blood Pressure: 146 /70 mmHg  Study Type:    TRANSTHORACIC ECHO (TTE) COMPLETE Diagnosis/ICD: Chronic diastolic (congestive) heart failure (CHF)-I50.32 Indication:    CHF, s/p TAVR CPT Code:      Echo Complete w Full [id number] Patient History: Pertinent History: CAD, HTN, HLD, s/p TAVR 26mm Evolut FX 9/15/2023, CHF, COPD. Study Detail: The following Echo studies were performed: 2D, M-Mode, Doppler and               color flow.  PHYSICIAN INTERPRETATION: Left Ventricle: The left ventricular systolic function is normal, with an estimated ejection fraction of 70%. There are no regional wall motion abnormalities. The left ventricular cavity size is normal. There is mild left ventricular hypertrophy. Spectral Doppler shows an impaired relaxation pattern of left ventricular diastolic filling. Left Atrium: The left atrium is moderately dilated. Right Ventricle: The right ventricle is mildly enlarged. There is mildly increased right ventriclar wall thickness. There is mildly reduced right ventricular systolic function. Right Atrium: The right atrium is normal in size. Aortic Valve: There is a prosthetic aortic valve present. There is no evidence of aortic valve regurgitation. The peak instantaneous gradient of the aortic valve is 15.2 mmHg. The mean gradient of the aortic valve is 8.6 mmHg. Bioprosthetic aortic valve (#26 Evolut) with normal hemodynamics and no perivalvular leak. Mitral Valve: The mitral valve is normal in structure. There is moderate mitral annular calcification. There is no evidence of mitral valve regurgitation. Tricuspid Valve: The tricuspid valve is structurally normal. No evidence of tricuspid regurgitation. Pulmonic Valve: The pulmonic valve is structurally normal. There is moderate pulmonic valve regurgitation. Pericardium: There is no pericardial effusion noted. Aorta: The aortic root is normal.  CONCLUSIONS:  1. Left ventricular systolic function is normal with a 70% estimated ejection fraction.  2. Spectral Doppler shows  an impaired relaxation pattern of left ventricular diastolic filling.  3. There is mildly reduced right ventricular systolic function.  4. The left atrium is moderately dilated.  5. There is moderate mitral annular calcification.  6. Bioprosthetic aortic valve (#26 Evolut) with normal hemodynamics and no perivalvular leak.  7. There is moderate pulmonic valve regurgitation.  8. Technically difficult study. QUANTITATIVE DATA SUMMARY: LA VOLUME:                               Normal Ranges: LA Vol A4C:        92.0 ml    (22+/-6mL/m2) LA Vol A2C:        61.2 ml LA Vol BP:         75.6 ml LA Vol Index A4C:  49.4 ml/m2 LA Vol Index A2C:  32.9 ml/m2 LA Vol Index BP:   40.6 ml/m2 LA Area A4C:       25.9 cm2 LA Area A2C:       21.3 cm2 LA Major Axis A4C: 6.2 cm LA Major Axis A2C: 6.3 cm LA Vol A4C:        82.8 ml LA Vol A2C:        59.0 ml LV SYSTOLIC FUNCTION BY 2D PLANIMETRY (MOD):                     Normal Ranges: EF-A4C View: 70.6 % (>=55%) EF-A2C View: 69.6 % EF-Biplane:  70.4 % LV DIASTOLIC FUNCTION:                         Normal Ranges: MV Peak E:  1.25 m/s    (0.7-1.2 m/s) MV Peak A:  1.75 m/s    (0.42-0.7 m/s) E/A Ratio:  0.71        (1.0-2.2) MV e'       0.04 m/s    (>8.0) MV A Dur:   133.79 msec E/e' Ratio: 27.72       (<8.0) MV DT:      259 msec    (150-240 msec) MITRAL VALVE:                       Normal Ranges: MV Vmax:    1.80 m/s  (<=1.3m/s) MV peak P.0 mmHg (<5mmHg) MV mean P.2 mmHg  (<2mmHg) MV VTI:     54.51 cm  (10-13cm) MV DT:      259 msec  (150-240msec) AORTIC VALVE:                                    Normal Ranges: AoV Vmax:                1.95 m/s  (<=1.7m/s) AoV Peak PG:             15.2 mmHg (<20mmHg) AoV Mean P.6 mmHg  (1.7-11.5mmHg) LVOT Max Darwin:            1.29 m/s  (<=1.1m/s) AoV VTI:                 46.20 cm  (18-25cm) LVOT VTI:                29.68 cm LVOT Diameter:           1.87 cm   (1.8-2.4cm) AoV Area, VTI:           1.76 cm2  (2.5-5.5cm2) AoV Area,Vmax:            1.80 cm2  (2.5-4.5cm2) AoV Dimensionless Index: 0.64  RIGHT VENTRICLE: TAPSE: 23.0 mm RV s'  0.11 m/s TRICUSPID VALVE/RVSP:                   Normal Ranges: IVC Diam: 1.50 cm PULMONIC VALVE:                         Normal Ranges: PV Accel Time: 99 msec  (>120ms) PV Max Darwin:    1.0 m/s  (0.6-0.9m/s) PV Max PG:     3.8 mmHg AORTA: Asc Ao Diam 2.74 cm  [id number] Aguila Nelson MD, [facility] Electronically signed on 5/14/2024 at 8:01:39 AM  ** Final **     Transthoracic Echo (TTE) Complete    Result Date: 10/21/2023               [facility], Echo Lab [address], [place] [address]             Tel [phone] and Fax [phone] TRANSTHORACIC ECHOCARDIOGRAM REPORT  Patient Name:      AZEB Hines Physician:    [id number] Green MD Study Date:        10/19/2023           Ordering Provider:    [id number] KRISTI COBIAN MRN/PID:           [id number]             Fellow: Accession#:        [id number]         Nurse: Date of Birth/Age: 2/10/1934 / 89 years Sonographer:          Kenneth Echavarria RD Gender:            F                    Additional Staff: Height:            167.64 cm            Admit Date: Weight:            82.10 kg             Admission Status:     Outpatient BSA:               1.92 m2              Encounter#:           [id number]                                         Department Location:  [place]                                                               Echo Lab Blood Pressure: 116 /74 mmHg Study Type:    TRANSTHORACIC ECHO (TTE) COMPLETE Diagnosis/ICD: Presence of prosthetic heart valve-Z95.2 Indication:    Presence of prosthetic heart valve CPT Code:      Echo Complete w Full Doppler-[id number] Patient History: Pertinent History: AS s/p TAVR (26mm Evolut  FX, done: 9/15/2023); COPD; CHF;                    smoker; CKD; HTN; CAD. Study Detail: The following Echo studies were performed: 2D, M-Mode, Doppler and               color flow. Technically challenging study due to body habitus.  PHYSICIAN INTERPRETATION: Left Ventricle: The left ventricular systolic function is normal, with an estimated ejection fraction of 65-70%. There are no regional wall motion abnormalities. The left ventricular cavity size is normal. Spectral Doppler shows an impaired relaxation pattern of left ventricular diastolic filling. Left Atrium: The left atrium is moderately dilated. Right Ventricle: The right ventricle is normal in size. There is normal right ventricular global systolic function. Right Atrium: The right atrium is normal in size. Aortic Valve: There is a prosthetic aortic valve present. There is transcatheter aortic valve replacement. The prosthetic aortic valve has regurgitation present. There is trace to mild aortic valve regurgitation. The peak instantaneous gradient of the aortic valve is 17.2 mmHg. The mean gradient of the aortic valve is 8.8 mmHg. Mitral Valve: The mitral valve is mild to moderately thickened. There is evidence of mild mitral valve stenosis. The doppler estimated mean and peak diastolic pressure gradients are 4.5 mmHg and 11.4 mmHg respectively. There is severe mitral annular calcification. There is trace to mild mitral valve regurgitation. Tricuspid Valve: The tricuspid valve is structurally normal. No evidence of tricuspid regurgitation. The right ventricular systolic pressure is unable to be estimated. Pulmonic Valve: The pulmonic valve is structurally normal. There is mild pulmonic valve regurgitation. Pericardium: There is no pericardial effusion noted. Aorta: The aortic root is normal. There is no dilatation of the ascending aorta. There is no dilatation of the aortic root. Systemic Veins: The inferior vena cava appears to be of normal size.   CONCLUSIONS:  1. Left ventricular systolic function is normal with a 65-70% estimated ejection fraction.  2. Spectral Doppler shows an impaired relaxation pattern of left ventricular diastolic filling.  3. The left atrium is moderately dilated.  4. There is severe mitral annular calcification.  5. There is a transcatheter aortic valve replacement. QUANTITATIVE DATA SUMMARY: 2D MEASUREMENTS:                    Normal Ranges: Ao Root d: 3.10 cm (2.0-3.7cm) LAs:       4.67 cm (2.7-4.0cm) LA VOLUME:                              Normal Ranges: LA Vol A4C:       79.4 ml    (22+/-6mL/m2) LA Vol A2C:       83.9 ml LA Vol BP:        81.7 ml LA Vol Index A4C: 41.4 ml/m2 LA Vol Index A2C: 43.7 ml/m2 LA Vol Index BP:  42.6 ml/m2 LA Volume Index:  42.6 ml/m2 LA Vol A4C:       73.4 ml LA Vol A2C:       79.9 ml RA VOLUME BY A/L METHOD:                       Normal Ranges: RA Area A4C: 11.0 cm2 AORTA MEASUREMENTS:                    Normal Ranges: Asc Ao, d: 2.80 cm (2.1-3.4cm) LV SYSTOLIC FUNCTION BY 2D PLANIMETRY (MOD):                     Normal Ranges: EF-A4C View: 68.8 % (>=55%) EF-A2C View: 69.2 % EF-Biplane:  68.3 % LV DIASTOLIC FUNCTION:                               Normal Ranges: MV Peak E:        1.32 m/s    (0.7-1.2 m/s) MV Peak A:        1.78 m/s    (0.42-0.7 m/s) E/A Ratio:        0.74        (1.0-2.2) MV e'             0.04 m/s    (>8.0) MV A Dur:         145.33 msec E/e' Ratio:       29.23       (<8.0) MV DT:            233 msec    (150-240 msec) PulmV Sys Darwin:    78.12 cm/s PulmV Mcconnell Darwin:   36.97 cm/s PulmV S/D Darwin:    2.11 PulmV A Revs Darwin: 32.93 cm/s PulmV A Revs Dur: 106.11 msec MITRAL VALVE:                       Normal Ranges: MV Vmax:    1.69 m/s  (<=1.3m/s) MV peak P.4 mmHg (<5mmHg) MV mean P.5 mmHg  (<2mmHg) MV VTI:     43.43 cm  (10-13cm) MV DT:      233 msec  (150-240msec) AORTIC VALVE:                                    Normal Ranges: AoV Vmax:                2.07 m/s  (<=1.7m/s) AoV Peak PG:              17.2 mmHg (<20mmHg) AoV Mean P.8 mmHg  (1.7-11.5mmHg) LVOT Max Darwin:            1.25 m/s  (<=1.1m/s) AoV VTI:                 48.04 cm  (18-25cm) LVOT VTI:                29.65 cm LVOT Diameter:           1.98 cm   (1.8-2.4cm) AoV Area, VTI:           1.89 cm2  (2.5-5.5cm2) AoV Area,Vmax:           1.85 cm2  (2.5-4.5cm2) AoV Dimensionless Index: 0.62 AORTIC INSUFFICIENCY: AI Vmax:       3.35 m/s AI Half-time:  589 msec AI Decel Time: 2030 msec AI Decel Rate: 165.19 cm/s2  RIGHT VENTRICLE: RV Basal 3.90 cm RV Mid   2.60 cm RV Major 5.8 cm TAPSE:   20.0 mm RV s'    0.11 m/s PULMONIC VALVE:                      Normal Ranges: PV Max Darwin: 0.9 m/s  (0.6-0.9m/s) PV Max PG:  3.3 mmHg Pulmonary Veins: PulmV A Revs Dur: 106.11 msec PulmV A Revs Darwin: 32.93 cm/s PulmV Mcconnell Darwin:   36.97 cm/s PulmV S/D Darwin:    2.11 PulmV Sys Darwin:    78.12 cm/s  [id number] Amilcar Green MD Electronically signed on 10/21/2023 at 3:31:04 PM  ** Final **            Heart Failure Follow up    NYHA class 2    Edema Denies  Dyspnea on Exertion Denies  Fatigue Stable  Exercise Intolerance Denies  Orthopnea Denies  PND Denies    Chest pain No  Syncope No  Palpitations No  Weight gain No  Weight loss No        All organ systems normal           K[facility]Q Questionnaire      1  Heart failure affects different people in different ways. Some feel shortness of breath while others feel fatigue. Please indicate how much you are limited by heart failure (shortness of breath or fatigue) in your ability to do the following activities over the past 2 weeks.     A.) Showering/bathing  5. Not at All  B.) Walking 1 block on level ground 4. Slightly  C.) Hurrying or Jogging   5. Not at All    2.  Over the past 2 weeks, how many times did you have swelling in your feet, ankles or legs when you woke up in the morning? 5. Never    3.  Over the past 2 weeks, on average, how many times has fatigue limited your ability to do what you wanted? 6. Less than once a  week    4.  Over the past 2 weeks, on average, how many times has shortness of breath limited your ability to do what you wanted? 7. Never    5.  Over the past 2 weeks, on average, how many times have you been forced to sleep sitting up in a chair or with at least 3 pillows to prop you up because of shortness of breath? Every night    6. Over the past 2 weeks, how much has your heart failure limited your enjoyment of life? It has slightly limited my enjoyment of life    7. If you had to spend the rest of your life with your heart failure the way it is right now, how would you feel about this? 3. Somewhat satisfied    8. How much does your heart failure affect your lifestyle? Please indicate how your heart failure may have limited yourparticipation in the following activities over the past 2 weeks    A.)  Hobbies, recreational activities  5. Did not limit at all    B.) Working or doing household chores  5. Did not limit at all    C.) Visiting family or friends out of your home  5. Did not limit at all    Impression    Doing well 1 year post TAVR, does have ANDERSON, especially when she has to walk faster.  Has follow ups with Dr. Nelson as well as Pulm coming up.      Plan:  - Cont current medication regimen  - ASA for life  - Needs echo now per TVT registry requirements (305-425 days post implant)  - f/u with PCP and cards  - Life-long Dental SBE prophylaxis needed

## 2024-10-10 PROBLEM — E66.01 MORBID OBESITY WITH BODY MASS INDEX (BMI) OF 40.0 OR HIGHER (MULTI): Status: RESOLVED | Noted: 2023-09-21 | Resolved: 2024-10-10

## 2024-10-28 ENCOUNTER — APPOINTMENT (OUTPATIENT)
Dept: CARDIOLOGY | Facility: CLINIC | Age: 89
End: 2024-10-28
Payer: MEDICARE

## 2024-10-28 VITALS
SYSTOLIC BLOOD PRESSURE: 140 MMHG | DIASTOLIC BLOOD PRESSURE: 64 MMHG | HEIGHT: 60 IN | OXYGEN SATURATION: 91 % | HEART RATE: 78 BPM | BODY MASS INDEX: 37.11 KG/M2 | WEIGHT: 189 LBS

## 2024-10-28 DIAGNOSIS — I50.32 CHRONIC DIASTOLIC CONGESTIVE HEART FAILURE: Primary | ICD-10-CM

## 2024-10-28 DIAGNOSIS — E78.2 MIXED HYPERLIPIDEMIA: ICD-10-CM

## 2024-10-28 DIAGNOSIS — R00.1 SINUS BRADYCARDIA: ICD-10-CM

## 2024-10-28 DIAGNOSIS — I25.10 CORONARY ARTERY DISEASE INVOLVING NATIVE CORONARY ARTERY OF NATIVE HEART WITHOUT ANGINA PECTORIS: ICD-10-CM

## 2024-10-28 DIAGNOSIS — I87.2 VENOUS INSUFFICIENCY: ICD-10-CM

## 2024-10-28 DIAGNOSIS — I35.0 NONRHEUMATIC AORTIC VALVE STENOSIS: ICD-10-CM

## 2024-10-28 DIAGNOSIS — Z95.2 S/P TAVR (TRANSCATHETER AORTIC VALVE REPLACEMENT): ICD-10-CM

## 2024-10-28 DIAGNOSIS — I50.9 CHRONIC HEART FAILURE, UNSPECIFIED HEART FAILURE TYPE: ICD-10-CM

## 2024-10-28 DIAGNOSIS — I10 PRIMARY HYPERTENSION: ICD-10-CM

## 2024-10-28 PROCEDURE — 99214 OFFICE O/P EST MOD 30 MIN: CPT | Performed by: INTERNAL MEDICINE

## 2024-10-28 PROCEDURE — 3077F SYST BP >= 140 MM HG: CPT | Performed by: INTERNAL MEDICINE

## 2024-10-28 PROCEDURE — 1036F TOBACCO NON-USER: CPT | Performed by: INTERNAL MEDICINE

## 2024-10-28 PROCEDURE — 93000 ELECTROCARDIOGRAM COMPLETE: CPT | Performed by: INTERNAL MEDICINE

## 2024-10-28 PROCEDURE — 1159F MED LIST DOCD IN RCRD: CPT | Performed by: INTERNAL MEDICINE

## 2024-10-28 PROCEDURE — 3078F DIAST BP <80 MM HG: CPT | Performed by: INTERNAL MEDICINE

## 2024-10-28 RX ORDER — TORSEMIDE 10 MG/1
10 TABLET ORAL DAILY
Qty: 90 TABLET | Refills: 3 | Status: SHIPPED | OUTPATIENT
Start: 2024-10-28 | End: 2025-10-28

## 2025-02-17 ENCOUNTER — APPOINTMENT (OUTPATIENT)
Dept: PRIMARY CARE | Facility: CLINIC | Age: OVER 89
End: 2025-02-17
Payer: MEDICARE

## 2025-05-12 ENCOUNTER — HOSPITAL ENCOUNTER (OUTPATIENT)
Dept: CARDIOLOGY | Facility: CLINIC | Age: OVER 89
Discharge: HOME | End: 2025-05-12
Payer: MEDICARE

## 2025-05-12 ENCOUNTER — APPOINTMENT (OUTPATIENT)
Dept: CARDIOLOGY | Facility: CLINIC | Age: OVER 89
End: 2025-05-12
Payer: MEDICARE

## 2025-05-12 VITALS
HEART RATE: 66 BPM | SYSTOLIC BLOOD PRESSURE: 138 MMHG | BODY MASS INDEX: 36.52 KG/M2 | HEIGHT: 60 IN | OXYGEN SATURATION: 90 % | WEIGHT: 186 LBS | DIASTOLIC BLOOD PRESSURE: 70 MMHG

## 2025-05-12 DIAGNOSIS — E78.2 MIXED HYPERLIPIDEMIA: ICD-10-CM

## 2025-05-12 DIAGNOSIS — R00.1 SINUS BRADYCARDIA: ICD-10-CM

## 2025-05-12 DIAGNOSIS — Z95.2 S/P TAVR (TRANSCATHETER AORTIC VALVE REPLACEMENT): ICD-10-CM

## 2025-05-12 DIAGNOSIS — I10 PRIMARY HYPERTENSION: ICD-10-CM

## 2025-05-12 DIAGNOSIS — I25.10 CORONARY ARTERY DISEASE INVOLVING NATIVE CORONARY ARTERY OF NATIVE HEART WITHOUT ANGINA PECTORIS: ICD-10-CM

## 2025-05-12 DIAGNOSIS — I87.2 VENOUS INSUFFICIENCY: ICD-10-CM

## 2025-05-12 DIAGNOSIS — I50.32 CHRONIC DIASTOLIC CONGESTIVE HEART FAILURE: ICD-10-CM

## 2025-05-12 DIAGNOSIS — I35.0 NONRHEUMATIC AORTIC VALVE STENOSIS: Primary | ICD-10-CM

## 2025-05-12 PROCEDURE — 1159F MED LIST DOCD IN RCRD: CPT | Performed by: INTERNAL MEDICINE

## 2025-05-12 PROCEDURE — 3075F SYST BP GE 130 - 139MM HG: CPT | Performed by: INTERNAL MEDICINE

## 2025-05-12 PROCEDURE — 0932T N-INVS DET HRT FAIL AUG ECHO: CPT

## 2025-05-12 PROCEDURE — 3078F DIAST BP <80 MM HG: CPT | Performed by: INTERNAL MEDICINE

## 2025-05-12 PROCEDURE — 99214 OFFICE O/P EST MOD 30 MIN: CPT | Performed by: INTERNAL MEDICINE

## 2025-05-12 PROCEDURE — 93306 TTE W/DOPPLER COMPLETE: CPT | Performed by: INTERNAL MEDICINE

## 2025-05-12 RX ORDER — LOSARTAN POTASSIUM 100 MG/1
100 TABLET ORAL DAILY
Qty: 90 TABLET | Refills: 3 | Status: SHIPPED | OUTPATIENT
Start: 2025-05-12 | End: 2026-05-12

## 2025-05-12 RX ORDER — SIMVASTATIN 20 MG/1
20 TABLET, FILM COATED ORAL DAILY
Qty: 90 TABLET | Refills: 3 | Status: SHIPPED | OUTPATIENT
Start: 2025-05-12 | End: 2026-05-12

## 2025-05-12 RX ORDER — TORSEMIDE 10 MG/1
10 TABLET ORAL DAILY
Qty: 90 TABLET | Refills: 3 | Status: SHIPPED | OUTPATIENT
Start: 2025-05-12 | End: 2026-05-12

## 2025-05-12 ASSESSMENT — ENCOUNTER SYMPTOMS: DYSPNEA ON EXERTION: 1

## 2025-05-12 NOTE — PATIENT INSTRUCTIONS
We have ordered some blood tests for you.    Take the torsemide 5 mg daily.  If you see more swelling take the 10 mg tablet.    Your echocardiogram shows normal heart function.  Your heart valve is functioning perfectly.

## 2025-05-12 NOTE — PROGRESS NOTES
Subjective   Chelsey Cazares is a 89 y.o. female.    Chief Complaint:  Follow-up coronary artery disease, aortic valvular effacement.    HPI    We last saw her in October 2024.  Over the past several months she has done well.  No hospitalizations or emergency room visits.  Denies chest pain.  Tries to be very active.  Has her own place.  Tries to work out in the yard.  Gets no cardiac symptoms with activities.  She has had no falls.    The patient's TAVR procedure was performed on September 16, 2023.  She had a #26 valve placed.  Postprocedure she had no complications.      Cardiac catheterization in June 2023 demonstrated mild coronary artery disease.  She did have pulmonary hypertension.  Elevated left ventricular end-diastolic pressures.     As part of her work-up she had a CT scan of the abdomen.  This showed incidental finding of gallstones.     On April 17, 2021, she was hospitalized when she presented with respiratory failure and a syncopal event. The patient states she was bringing her groceries and putting them away. She became suddenly very weak and dizzy and lightheaded. She slumped to the floor and the rescue squad was called. At that point she lost consciousness. She was brought to the hospital where she is noted to be in respiratory failure.      Past cardiac history of diastolic congestive heart failure and aortic stenosis.     Past medical history: Significant for underlying chronic obstructive lung disease and renal insufficiency.     Long history of smoking 1 pack/day. She has been able to quit smoking.      Allergies  None Known    · No Known Allergies     Family History  Brother    · Family history of malignant neoplasm (V16.9) (Z80.9)     Social History  Problems    · Denies alcohol consumption (V49.89) (Z78.9)   · Former smoker (V15.82) (Z87.891)   · No alcohol use   · Single     Review of Systems   Cardiovascular:  Positive for dyspnea on exertion.   Musculoskeletal:  Positive for arthritis.    Gastrointestinal:  Positive for constipation.   All other systems reviewed and are negative.    Review of Systems   Cardiovascular:  Positive for dyspnea on exertion.   Musculoskeletal:  Positive for arthritis and joint pain.       Current Medications[1]     Visit Vitals  Smoking Status Never        Objective     Constitutional:       Appearance: Not in distress.   Neck:      Vascular: JVD normal.   Pulmonary:      Breath sounds: Normal breath sounds.   Cardiovascular:      Normal rate. Regular rhythm. Normal S1. Normal S2.       Murmurs: There is a grade 2/6 systolic murmur.      No gallop.    Pulses:     Intact distal pulses.   Edema:     Peripheral edema absent.   Abdominal:      General: There is no distension.      Palpations: Abdomen is soft.   Neurological:      Mental Status: Alert.         Lab Review:   Lab Results   Component Value Date     08/19/2024    K 4.7 08/19/2024     08/19/2024    CO2 29 08/19/2024    BUN 36 (H) 08/19/2024    CREATININE 1.18 (H) 08/19/2024    GLUCOSE 100 (H) 08/19/2024    CALCIUM 9.5 08/19/2024     Lab Results   Component Value Date    WBC 5.1 08/19/2024    HGB 12.1 08/19/2024    HCT 37.9 08/19/2024    MCV 90 08/19/2024     08/19/2024     Lab Results   Component Value Date    CHOL 197 08/19/2024    TRIG 76 08/19/2024    HDL 71.9 08/19/2024       Assessment:    1.  Coronary disease.  Mild by cardiac catheterization.  Continue medical management.    2.  Status post transaortic valve replacement.  Today's echocardiographic study demonstrates a normally functioning transaortic valve with normal hemodynamics and no significant perivalvular leak.    3.  Hypertension.  Slightly elevated systolic pressure but normal diastolic pressure.    4.  Lower extremity edema.  Complete resolution.    5.  Diastolic congestive heart failure.  Latest BNP was normal.    Encouraged follow-up with primary care.  Will defer to primary care for blood work.  Have ordered some basic blood  work including BNP.         [1]   Current Outpatient Medications   Medication Sig Dispense Refill    albuterol 90 mcg/actuation inhaler Inhale 2 puffs every 4 hours if needed for wheezing. 18 g 3    aspirin 81 mg EC tablet Take 1 tablet (81 mg) by mouth once daily.      calcium carbonate 600 mg calcium (1,500 mg) tablet Take 2 tablets (1,200 mg) by mouth once daily.      cholecalciferol (Vitamin D-3) 5,000 Units tablet Take 1 tablet (5,000 Units) by mouth once daily.      coenzyme Q10-vitamin E 100-5 mg-unit capsule Take 1 capsule by mouth once daily.      fish oil concentrate (Omega-3) 120-180 mg capsule Take 1 capsule (1 g) by mouth once daily.      fluticasone propion-salmeteroL (Advair Diskus) 250-50 mcg/dose diskus inhaler use 1 inhalation every 12 hours, Rinse mouth well after use.      glucosamine HCl 1,500 mg tablet Take by mouth.      losartan (Cozaar) 100 mg tablet Take 1 tablet (100 mg) by mouth once daily. 90 tablet 3    multivitamin tablet Take 1 tablet by mouth once daily.      pyridoxine-kelp-lect-vinegar tablet Take 1 tablet by mouth once daily.      simvastatin (Zocor) 20 mg tablet Take 1 tablet (20 mg) by mouth once daily. 90 tablet 3    Spiriva Respimat 2.5 mcg/actuation inhaler Inhale 2 puffs once daily. 3 each 3    torsemide (Demadex) 10 mg tablet Take 1 tablet (10 mg) by mouth once daily. 90 tablet 3    vitamin B complex/folic acid (B-STRESS ORAL) Take 1 capsule by mouth once daily.       No current facility-administered medications for this visit.

## 2025-05-13 LAB
AORTIC VALVE MEAN GRADIENT: 8 MMHG
AORTIC VALVE PEAK VELOCITY: 2 M/S
AV PEAK GRADIENT: 16 MMHG
AVA (PEAK VEL): 1.16 CM2
AVA (VTI): 1.31 CM2
BODY SURFACE AREA: 1.89 M2
EJECTION FRACTION APICAL 4 CHAMBER: 60.9
EJECTION FRACTION: 63 %
LEFT ATRIUM VOLUME AREA LENGTH INDEX BSA: 37.6 ML/M2
LEFT VENTRICULAR OUTFLOW TRACT DIAMETER: 1.69 CM
MITRAL VALVE E/A RATIO: 0.88
RIGHT VENTRICLE FREE WALL PEAK S': 10 CM/S
TRICUSPID ANNULAR PLANE SYSTOLIC EXCURSION: 1.9 CM

## 2025-05-19 DIAGNOSIS — J44.9 MODERATE COPD (CHRONIC OBSTRUCTIVE PULMONARY DISEASE) (MULTI): ICD-10-CM

## 2025-05-19 RX ORDER — ALBUTEROL SULFATE 90 UG/1
2 INHALANT RESPIRATORY (INHALATION) EVERY 4 HOURS PRN
Qty: 18 G | Refills: 3 | Status: SHIPPED | OUTPATIENT
Start: 2025-05-19 | End: 2026-05-19

## 2025-06-27 ENCOUNTER — TELEPHONE (OUTPATIENT)
Dept: CARDIOLOGY | Facility: CLINIC | Age: OVER 89
End: 2025-06-27
Payer: MEDICARE

## 2025-06-27 NOTE — TELEPHONE ENCOUNTER
Calling to let Dr. Nelson know that patient is not picking up meds and possibly not taking Losartan and Zocor, just a fyi

## 2025-07-01 NOTE — TELEPHONE ENCOUNTER
Attempted to call pt, no answer and vm full. Per active mediation list, pt receiving Losartan and Zocor from [facility] and picked up refill for both on 5/21/25, 90 day supply.

## 2025-08-22 ENCOUNTER — APPOINTMENT (OUTPATIENT)
Dept: PRIMARY CARE | Facility: CLINIC | Age: OVER 89
End: 2025-08-22
Payer: MEDICARE

## 2025-08-22 VITALS
DIASTOLIC BLOOD PRESSURE: 68 MMHG | BODY MASS INDEX: 34.96 KG/M2 | OXYGEN SATURATION: 91 % | SYSTOLIC BLOOD PRESSURE: 128 MMHG | HEART RATE: 79 BPM | WEIGHT: 179 LBS

## 2025-08-22 DIAGNOSIS — J44.9 MODERATE COPD (CHRONIC OBSTRUCTIVE PULMONARY DISEASE) (MULTI): ICD-10-CM

## 2025-08-22 DIAGNOSIS — Z95.2 S/P TAVR (TRANSCATHETER AORTIC VALVE REPLACEMENT): ICD-10-CM

## 2025-08-22 DIAGNOSIS — Z00.00 ROUTINE GENERAL MEDICAL EXAMINATION AT HEALTH CARE FACILITY: Primary | ICD-10-CM

## 2025-08-22 DIAGNOSIS — E78.2 MIXED HYPERLIPIDEMIA: ICD-10-CM

## 2025-08-22 DIAGNOSIS — J96.11 CHRONIC RESPIRATORY FAILURE WITH HYPOXIA: ICD-10-CM

## 2025-08-22 DIAGNOSIS — N18.31 CHRONIC RENAL IMPAIRMENT, STAGE 3A (MULTI): ICD-10-CM

## 2025-08-22 DIAGNOSIS — I10 PRIMARY HYPERTENSION: ICD-10-CM

## 2025-08-22 PROBLEM — S32.9XXA PELVIC FRACTURE (MULTI): Status: RESOLVED | Noted: 2023-08-18 | Resolved: 2025-08-22

## 2025-08-22 PROCEDURE — 1036F TOBACCO NON-USER: CPT | Performed by: FAMILY MEDICINE

## 2025-08-22 PROCEDURE — 3074F SYST BP LT 130 MM HG: CPT | Performed by: FAMILY MEDICINE

## 2025-08-22 PROCEDURE — 99497 ADVNCD CARE PLAN 30 MIN: CPT | Performed by: FAMILY MEDICINE

## 2025-08-22 PROCEDURE — 1159F MED LIST DOCD IN RCRD: CPT | Performed by: FAMILY MEDICINE

## 2025-08-22 PROCEDURE — G0439 PPPS, SUBSEQ VISIT: HCPCS | Performed by: FAMILY MEDICINE

## 2025-08-22 PROCEDURE — 99397 PER PM REEVAL EST PAT 65+ YR: CPT | Performed by: FAMILY MEDICINE

## 2025-08-22 PROCEDURE — 1160F RVW MEDS BY RX/DR IN RCRD: CPT | Performed by: FAMILY MEDICINE

## 2025-08-22 PROCEDURE — 3078F DIAST BP <80 MM HG: CPT | Performed by: FAMILY MEDICINE

## 2025-08-22 PROCEDURE — G0442 ANNUAL ALCOHOL SCREEN 15 MIN: HCPCS | Performed by: FAMILY MEDICINE

## 2025-08-22 PROCEDURE — 1170F FXNL STATUS ASSESSED: CPT | Performed by: FAMILY MEDICINE

## 2025-08-22 RX ORDER — ALBUTEROL SULFATE 90 UG/1
2 INHALANT RESPIRATORY (INHALATION) EVERY 4 HOURS PRN
Qty: 18 G | Refills: 3 | Status: SHIPPED | OUTPATIENT
Start: 2025-08-22 | End: 2026-08-22

## 2025-08-22 ASSESSMENT — ACTIVITIES OF DAILY LIVING (ADL)
TAKING_MEDICATION: INDEPENDENT
GROCERY_SHOPPING: INDEPENDENT
DRESSING: INDEPENDENT
BATHING: INDEPENDENT
DOING_HOUSEWORK: INDEPENDENT
MANAGING_FINANCES: INDEPENDENT

## 2025-08-22 ASSESSMENT — ENCOUNTER SYMPTOMS
LOSS OF SENSATION IN FEET: 0
DEPRESSION: 0
OCCASIONAL FEELINGS OF UNSTEADINESS: 1

## 2025-08-22 ASSESSMENT — PATIENT HEALTH QUESTIONNAIRE - PHQ9
SUM OF ALL RESPONSES TO PHQ9 QUESTIONS 1 AND 2: 0
1. LITTLE INTEREST OR PLEASURE IN DOING THINGS: NOT AT ALL
2. FEELING DOWN, DEPRESSED OR HOPELESS: NOT AT ALL

## 2025-08-23 LAB
ALBUMIN SERPL-MCNC: 3.9 G/DL (ref 3.6–5.1)
ALP SERPL-CCNC: 67 U/L (ref 37–153)
ALT SERPL-CCNC: 17 U/L (ref 6–29)
ANION GAP SERPL CALCULATED.4IONS-SCNC: 12 MMOL/L (CALC) (ref 7–17)
AST SERPL-CCNC: 17 U/L (ref 10–35)
BILIRUB SERPL-MCNC: 0.7 MG/DL (ref 0.2–1.2)
BNP SERPL-MCNC: NORMAL PG/ML
BUN SERPL-MCNC: 35 MG/DL (ref 7–25)
CALCIUM SERPL-MCNC: 9.4 MG/DL (ref 8.6–10.4)
CHLORIDE SERPL-SCNC: 104 MMOL/L (ref 98–110)
CHOLEST SERPL-MCNC: 157 MG/DL
CHOLEST/HDLC SERPL: 2.2 (CALC)
CO2 SERPL-SCNC: 26 MMOL/L (ref 20–32)
CREAT SERPL-MCNC: 1.14 MG/DL (ref 0.6–0.95)
EGFRCR SERPLBLD CKD-EPI 2021: 45 ML/MIN/1.73M2
ERYTHROCYTE [DISTWIDTH] IN BLOOD BY AUTOMATED COUNT: 13.3 % (ref 11–15)
GLUCOSE SERPL-MCNC: 93 MG/DL (ref 65–99)
HCT VFR BLD AUTO: 37 % (ref 35–45)
HDLC SERPL-MCNC: 72 MG/DL
HGB BLD-MCNC: 11.6 G/DL (ref 11.7–15.5)
LDLC SERPL CALC-MCNC: 71 MG/DL (CALC)
MCH RBC QN AUTO: 29.7 PG (ref 27–33)
MCHC RBC AUTO-ENTMCNC: 31.4 G/DL (ref 32–36)
MCV RBC AUTO: 94.6 FL (ref 80–100)
NONHDLC SERPL-MCNC: 85 MG/DL (CALC)
PLATELET # BLD AUTO: 171 THOUSAND/UL (ref 140–400)
PMV BLD REES-ECKER: 11.5 FL (ref 7.5–12.5)
POTASSIUM SERPL-SCNC: 5 MMOL/L (ref 3.5–5.3)
PROT SERPL-MCNC: 6.6 G/DL (ref 6.1–8.1)
RBC # BLD AUTO: 3.91 MILLION/UL (ref 3.8–5.1)
SODIUM SERPL-SCNC: 142 MMOL/L (ref 135–146)
TRIGL SERPL-MCNC: 62 MG/DL
WBC # BLD AUTO: 5.1 THOUSAND/UL (ref 3.8–10.8)

## 2025-08-25 LAB
ALBUMIN SERPL-MCNC: 3.9 G/DL (ref 3.6–5.1)
ALP SERPL-CCNC: 67 U/L (ref 37–153)
ALT SERPL-CCNC: 17 U/L (ref 6–29)
ANION GAP SERPL CALCULATED.4IONS-SCNC: 12 MMOL/L (CALC) (ref 7–17)
AST SERPL-CCNC: 17 U/L (ref 10–35)
BILIRUB SERPL-MCNC: 0.7 MG/DL (ref 0.2–1.2)
BNP SERPL-MCNC: 49 PG/ML
BUN SERPL-MCNC: 35 MG/DL (ref 7–25)
CALCIUM SERPL-MCNC: 9.4 MG/DL (ref 8.6–10.4)
CHLORIDE SERPL-SCNC: 104 MMOL/L (ref 98–110)
CHOLEST SERPL-MCNC: 157 MG/DL
CHOLEST/HDLC SERPL: 2.2 (CALC)
CO2 SERPL-SCNC: 26 MMOL/L (ref 20–32)
CREAT SERPL-MCNC: 1.14 MG/DL (ref 0.6–0.95)
EGFRCR SERPLBLD CKD-EPI 2021: 45 ML/MIN/1.73M2
ERYTHROCYTE [DISTWIDTH] IN BLOOD BY AUTOMATED COUNT: 13.3 % (ref 11–15)
GLUCOSE SERPL-MCNC: 93 MG/DL (ref 65–99)
HCT VFR BLD AUTO: 37 % (ref 35–45)
HDLC SERPL-MCNC: 72 MG/DL
HGB BLD-MCNC: 11.6 G/DL (ref 11.7–15.5)
LDLC SERPL CALC-MCNC: 71 MG/DL (CALC)
MCH RBC QN AUTO: 29.7 PG (ref 27–33)
MCHC RBC AUTO-ENTMCNC: 31.4 G/DL (ref 32–36)
MCV RBC AUTO: 94.6 FL (ref 80–100)
NONHDLC SERPL-MCNC: 85 MG/DL (CALC)
PLATELET # BLD AUTO: 171 THOUSAND/UL (ref 140–400)
PMV BLD REES-ECKER: 11.5 FL (ref 7.5–12.5)
POTASSIUM SERPL-SCNC: 5 MMOL/L (ref 3.5–5.3)
PROT SERPL-MCNC: 6.6 G/DL (ref 6.1–8.1)
RBC # BLD AUTO: 3.91 MILLION/UL (ref 3.8–5.1)
SODIUM SERPL-SCNC: 142 MMOL/L (ref 135–146)
TRIGL SERPL-MCNC: 62 MG/DL
WBC # BLD AUTO: 5.1 THOUSAND/UL (ref 3.8–10.8)

## 2025-11-17 ENCOUNTER — APPOINTMENT (OUTPATIENT)
Dept: CARDIOLOGY | Facility: CLINIC | Age: OVER 89
End: 2025-11-17
Payer: MEDICARE

## 2026-08-26 ENCOUNTER — APPOINTMENT (OUTPATIENT)
Dept: PRIMARY CARE | Facility: CLINIC | Age: OVER 89
End: 2026-08-26
Payer: MEDICARE